# Patient Record
Sex: FEMALE | Race: BLACK OR AFRICAN AMERICAN | NOT HISPANIC OR LATINO | Employment: FULL TIME | URBAN - METROPOLITAN AREA
[De-identification: names, ages, dates, MRNs, and addresses within clinical notes are randomized per-mention and may not be internally consistent; named-entity substitution may affect disease eponyms.]

---
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
## 2021-08-10 ENCOUNTER — HISTORICAL (OUTPATIENT)
Dept: LAB | Facility: HOSPITAL | Age: 21
End: 2021-08-10

## 2021-08-10 LAB
ABS NEUT (OLG): 1.05 X10(3)/MCL (ref 2.1–9.2)
ALBUMIN SERPL-MCNC: 4 GM/DL (ref 3.5–5)
ALBUMIN/GLOB SERPL: 1.3 RATIO (ref 1.1–2)
ALP SERPL-CCNC: 49 UNIT/L (ref 40–150)
ALT SERPL-CCNC: 10 UNIT/L (ref 0–55)
AST SERPL-CCNC: 15 UNIT/L (ref 5–34)
BASOPHILS # BLD AUTO: 0 X10(3)/MCL (ref 0–0.2)
BASOPHILS NFR BLD AUTO: 1 %
BILIRUB SERPL-MCNC: 1 MG/DL
BILIRUBIN DIRECT+TOT PNL SERPL-MCNC: 0.4 MG/DL (ref 0–0.5)
BILIRUBIN DIRECT+TOT PNL SERPL-MCNC: 0.6 MG/DL (ref 0–0.8)
BUN SERPL-MCNC: 6.2 MG/DL (ref 7–18.7)
CALCIUM SERPL-MCNC: 9.2 MG/DL (ref 8.4–10.2)
CHLORIDE SERPL-SCNC: 105 MMOL/L (ref 98–107)
CHOLEST SERPL-MCNC: 155 MG/DL
CHOLEST/HDLC SERPL: 3 {RATIO} (ref 0–5)
CO2 SERPL-SCNC: 24 MMOL/L (ref 22–29)
CREAT SERPL-MCNC: 0.67 MG/DL (ref 0.55–1.02)
DEPRECATED CALCIDIOL+CALCIFEROL SERPL-MC: 20.3 NG/ML (ref 30–80)
EOSINOPHIL # BLD AUTO: 0.1 X10(3)/MCL (ref 0–0.9)
EOSINOPHIL NFR BLD AUTO: 3 %
ERYTHROCYTE [DISTWIDTH] IN BLOOD BY AUTOMATED COUNT: 15.4 % (ref 11.5–17)
GLOBULIN SER-MCNC: 3.1 GM/DL (ref 2.4–3.5)
GLUCOSE SERPL-MCNC: 81 MG/DL (ref 74–100)
HCT VFR BLD AUTO: 37 % (ref 37–47)
HDLC SERPL-MCNC: 59 MG/DL (ref 35–60)
HGB BLD-MCNC: 11.4 GM/DL (ref 12–16)
LDLC SERPL CALC-MCNC: 87 MG/DL (ref 50–140)
LYMPHOCYTES # BLD AUTO: 1.5 X10(3)/MCL (ref 0.6–4.6)
LYMPHOCYTES NFR BLD AUTO: 50 %
MCH RBC QN AUTO: 26 PG (ref 27–31)
MCHC RBC AUTO-ENTMCNC: 30.8 GM/DL (ref 33–36)
MCV RBC AUTO: 84.5 FL (ref 80–94)
MONOCYTES # BLD AUTO: 0.3 X10(3)/MCL (ref 0.1–1.3)
MONOCYTES NFR BLD AUTO: 9 %
NEUTROPHILS # BLD AUTO: 1.05 X10(3)/MCL (ref 1.4–7.9)
NEUTROPHILS NFR BLD AUTO: 36 %
PLATELET # BLD AUTO: 292 X10(3)/MCL (ref 130–400)
PMV BLD AUTO: 11 FL (ref 9.4–12.4)
POTASSIUM SERPL-SCNC: 4.3 MMOL/L (ref 3.5–5.1)
PROT SERPL-MCNC: 7.1 GM/DL (ref 6.4–8.3)
RBC # BLD AUTO: 4.38 X10(6)/MCL (ref 4.2–5.4)
SODIUM SERPL-SCNC: 137 MMOL/L (ref 136–145)
TRIGL SERPL-MCNC: 45 MG/DL (ref 37–140)
TSH SERPL-ACNC: 0.66 UIU/ML (ref 0.35–4.94)
VLDLC SERPL CALC-MCNC: 9 MG/DL
WBC # SPEC AUTO: 2.9 X10(3)/MCL (ref 4.5–11.5)

## 2022-04-11 ENCOUNTER — HISTORICAL (OUTPATIENT)
Dept: ADMINISTRATIVE | Facility: HOSPITAL | Age: 22
End: 2022-04-11
Payer: MEDICAID

## 2022-04-24 VITALS
DIASTOLIC BLOOD PRESSURE: 70 MMHG | SYSTOLIC BLOOD PRESSURE: 106 MMHG | OXYGEN SATURATION: 97 % | BODY MASS INDEX: 19.17 KG/M2 | WEIGHT: 115.06 LBS | HEIGHT: 65 IN

## 2022-06-14 ENCOUNTER — OFFICE VISIT (OUTPATIENT)
Dept: FAMILY MEDICINE | Facility: CLINIC | Age: 22
End: 2022-06-14
Payer: MEDICAID

## 2022-06-14 VITALS
DIASTOLIC BLOOD PRESSURE: 71 MMHG | HEIGHT: 64 IN | HEART RATE: 98 BPM | WEIGHT: 112 LBS | BODY MASS INDEX: 19.12 KG/M2 | SYSTOLIC BLOOD PRESSURE: 109 MMHG

## 2022-06-14 DIAGNOSIS — R10.13 EPIGASTRIC PAIN: ICD-10-CM

## 2022-06-14 DIAGNOSIS — G44.86 CERVICOGENIC HEADACHE: Primary | ICD-10-CM

## 2022-06-14 PROCEDURE — 1160F RVW MEDS BY RX/DR IN RCRD: CPT | Mod: CPTII,,, | Performed by: NURSE PRACTITIONER

## 2022-06-14 PROCEDURE — 99213 OFFICE O/P EST LOW 20 MIN: CPT | Mod: ,,, | Performed by: NURSE PRACTITIONER

## 2022-06-14 PROCEDURE — 1159F MED LIST DOCD IN RCRD: CPT | Mod: CPTII,,, | Performed by: NURSE PRACTITIONER

## 2022-06-14 PROCEDURE — 1159F PR MEDICATION LIST DOCUMENTED IN MEDICAL RECORD: ICD-10-PCS | Mod: CPTII,,, | Performed by: NURSE PRACTITIONER

## 2022-06-14 PROCEDURE — 3078F DIAST BP <80 MM HG: CPT | Mod: CPTII,,, | Performed by: NURSE PRACTITIONER

## 2022-06-14 PROCEDURE — 3008F BODY MASS INDEX DOCD: CPT | Mod: CPTII,,, | Performed by: NURSE PRACTITIONER

## 2022-06-14 PROCEDURE — 3078F PR MOST RECENT DIASTOLIC BLOOD PRESSURE < 80 MM HG: ICD-10-PCS | Mod: CPTII,,, | Performed by: NURSE PRACTITIONER

## 2022-06-14 PROCEDURE — 99213 PR OFFICE/OUTPT VISIT, EST, LEVL III, 20-29 MIN: ICD-10-PCS | Mod: ,,, | Performed by: NURSE PRACTITIONER

## 2022-06-14 PROCEDURE — 3008F PR BODY MASS INDEX (BMI) DOCUMENTED: ICD-10-PCS | Mod: CPTII,,, | Performed by: NURSE PRACTITIONER

## 2022-06-14 PROCEDURE — 3074F SYST BP LT 130 MM HG: CPT | Mod: CPTII,,, | Performed by: NURSE PRACTITIONER

## 2022-06-14 PROCEDURE — 1160F PR REVIEW ALL MEDS BY PRESCRIBER/CLIN PHARMACIST DOCUMENTED: ICD-10-PCS | Mod: CPTII,,, | Performed by: NURSE PRACTITIONER

## 2022-06-14 PROCEDURE — 3074F PR MOST RECENT SYSTOLIC BLOOD PRESSURE < 130 MM HG: ICD-10-PCS | Mod: CPTII,,, | Performed by: NURSE PRACTITIONER

## 2022-06-14 RX ORDER — KETOROLAC TROMETHAMINE 30 MG/ML
30 INJECTION, SOLUTION INTRAMUSCULAR; INTRAVENOUS
Status: COMPLETED | OUTPATIENT
Start: 2022-06-14 | End: 2022-06-14

## 2022-06-14 RX ORDER — ONDANSETRON 4 MG/1
4 TABLET, ORALLY DISINTEGRATING ORAL EVERY 8 HOURS PRN
Qty: 12 TABLET | Refills: 0 | Status: SHIPPED | OUTPATIENT
Start: 2022-06-14 | End: 2022-06-19

## 2022-06-14 RX ORDER — CYCLOBENZAPRINE HCL 10 MG
10 TABLET ORAL NIGHTLY
Qty: 15 TABLET | Refills: 0 | Status: SHIPPED | OUTPATIENT
Start: 2022-06-14 | End: 2022-06-29

## 2022-06-14 RX ORDER — OMEPRAZOLE 20 MG/1
20 CAPSULE, DELAYED RELEASE ORAL DAILY
Qty: 30 CAPSULE | Refills: 1 | Status: SHIPPED | OUTPATIENT
Start: 2022-06-14 | End: 2023-05-24

## 2022-06-14 RX ADMIN — KETOROLAC TROMETHAMINE 30 MG: 30 INJECTION, SOLUTION INTRAMUSCULAR; INTRAVENOUS at 01:06

## 2022-06-14 NOTE — PROGRESS NOTES
SUBJECTIVE:     History of Present Illness      Chief Complaint: Migraine (Migraine with upper abd pain x 2 weeks, fever last night (took 2nd COVID vacc. Yesterday )      HPI:  Patient is a 22 y.o. year old female who presents to clinic with complaints of headache and abdominal pain. Patient is not sure if the two symptoms are related.  Patient states over the past two weeks she has been having a headache on and off.  Patient states headache symptoms worsened yesterday after getting her 2nd COVID booster.  Patient states she has associated symptoms of fever of 101° F and myalgias.  Patient currently rates her headache as a 7/10 on the pain scale.  She states symptoms seemed to get worse throughout the day and are worse just before bed.  Symptoms start in the upper trap/neck region and radiate to the front of her head.  She does have history of neck pain and muscle spasm. Headache is not pulsatile. Denies thunderclap headache and worst headache of her life. Patient denies any changes in vision, auras, numbness, tingling, weakness of extremities, slurred speech, and facial drooping.  Patient states both her parents get migraines.  Patient does have family medical history of strokes in both of her grandparents at a old age.  Patient states she is maintaining hydration.  She has been taking ibuprofen before bed which has been relieving her symptoms mildly. Denies URI symptoms. She took a home COVID test about 5-7 days ago which was negative.     Patient states over the past couple of months she has been having on and off epigastric abdominal pain.  Patient states symptoms started suddenly. Currently patient denies abdominal pain. She describes the pain as sharp and rates as a 10/10 on the pain scale.  Patient does have medical history of stomach ulcer when she was in high school.  Things such as eating fatty foods make her pain worse.  Eating like food such as crackers make her pain better. Pain is worse with large  "meals. She has been taking Pepto-Bismol which does not seem to help.  She has associated symptoms of nausea which she has been taking Zofran for.  Patient denies any vomiting, coffee-ground emesis, and dark tarry stools. No diarrhea.     Review of Systems:  General: Denies fever, chills, fatigue, myalgias, and change in appetite   Eyes: Denies change in vision, eye redness, eye drainage, eye pain  ENT: Denies ear pain or pressure, rhinorrhea, nasal congestion, sore throat, and trouble swallowing  Resp: Denies wheezing, and shortness of breath   Cardio: Denies chest pain, palpitations, pleuritic pain, and edema   GI: See above   : Denies dysuria, hematuria, and discharge   MSK: Denies trauma, joint pain, and trouble ambulating   Neuro: Denies LOC, dizziness, seizure like activity, and focal deficits   Skin: Denies rashes, open lesions and ulcers      Previous History      Review of patient's allergies indicates:  No Known Allergies    History reviewed. No pertinent past medical history.  No current outpatient medications  Past Surgical History:   Procedure Laterality Date    APPENDECTOMY      hem       History reviewed. No pertinent family history.    Social History     Tobacco Use    Smoking status: Never Smoker    Smokeless tobacco: Never Used   Substance Use Topics    Alcohol use: Never    Drug use: Never       OBJECTIVE:     Physical Exam      Vital Signs Reviewed   /71   Pulse 98   Ht 5' 4" (1.626 m)   Wt 50.8 kg (112 lb)   BMI 19.22 kg/m²     Physical Exam:  General: Alert, well nourished, no acute distress, non-toxic appearing.   Eyes: Anicteric sclera, without conjunctival injection, normal lids, no purulent drainage, EOMs fully intact, PERRLA  Ears: No tragal tenderness. Tympanic membranes intact, pearly grey, without effusion or erythema and with a positive light reflex.   Mouth: Posterior pharynx without erythema. No exudate, ulcerations, or lesion. No tonsillar swelling.   Neck: Supple, " full ROM, no rigidity, no cervical adenopathy.   Cardio: Normal rate and rhythm without murmurs, gallops, or rubs.   Resp: Respirations even and unlabored, clear to auscultation bilaterally.   Abd: No ecchymosis or distension. Normal bowel sounds in all 4 quadrants.Tenderness to palpation in the epigastric and RUQ. Positive giordano. No rebound tenderness or guarding.   Skin: No rashes or open lesions noted.   MSK: No swelling. No abrasions or signs of trauma. Ambulating without assistance.  Full and equal strength of the upper and lower extremities bilaterally.  Full and equal  strength bilaterally.  Full and equal strength upon plantar and dorsiflexion of the feet bilaterally.  Neuro: CN1-12 intact fully. No focal deficits noted. Facial expressions even.      Assessment       1. Cervicogenic headache    2. Epigastric pain           Plan       1. Cervicogenic headache   Cyclobenzaprine and Tylenol. Torodol injection performed in clinic. Hold off on Ibuprofen as this may worsen GI symptoms. Discussed she should seek further medical attention at the first sign of any new or worsening symptoms.     May consider Mono testing and blood work at next visit if symptoms are not better.      2. Epigastric pain   Patient presents with episodic epigastric pain.  Currently denies any abdominal pain.  No coffee-ground emesis or dark tarry stools.  Does have history of peptic ulcer disease and has been taking more NSAIDs due to headache.    Abdominal ultrasound.  Follow-up with GI. Omeprazole 20 mg daily.  Discussed she should stay away from fatty and fried foods and spicy foods.  Zofran for nausea as needed.  Discussed with patient if she starts to experience severe abdominal pain she should go to ER immediately for further evaluation.  Follow-up in two weeks to discuss current symptoms and ultrasound results.               Future Appointments   Date Time Provider Department Center   6/28/2022  2:20 PM KATHI Peck  [facility]   8/4/2022  9:00 AM KATHI Peck [facility]

## 2022-06-15 ENCOUNTER — HOSPITAL ENCOUNTER (OUTPATIENT)
Dept: RADIOLOGY | Facility: HOSPITAL | Age: 22
Discharge: HOME | End: 2022-06-15
Attending: NURSE PRACTITIONER
Payer: MEDICAID

## 2022-06-15 DIAGNOSIS — R10.13 EPIGASTRIC PAIN: ICD-10-CM

## 2022-06-15 PROCEDURE — 76700 US EXAM ABDOM COMPLETE: CPT | Mod: TC

## 2022-06-30 ENCOUNTER — LAB VISIT (OUTPATIENT)
Dept: LAB | Facility: HOSPITAL | Age: 22
End: 2022-06-30
Attending: NURSE PRACTITIONER
Payer: MEDICAID

## 2022-06-30 DIAGNOSIS — Z03.89 ENCNTR FOR OBS FOR OTH SUSPECTED DISEASES AND COND RULED OUT: ICD-10-CM

## 2022-06-30 DIAGNOSIS — Z34.91 FIRST TRIMESTER PREGNANCY: ICD-10-CM

## 2022-06-30 LAB
AMPHET UR QL SCN: NEGATIVE
B-HCG FREE SERPL-ACNC: 147.16 MIU/ML
BARBITURATE SCN PRESENT UR: NEGATIVE
BASOPHILS # BLD AUTO: 0.03 X10(3)/MCL (ref 0–0.2)
BASOPHILS NFR BLD AUTO: 0.9 %
BENZODIAZ UR QL SCN: NEGATIVE
CANNABINOIDS UR QL SCN: POSITIVE
COCAINE UR QL SCN: NEGATIVE
EOSINOPHIL # BLD AUTO: 0.08 X10(3)/MCL (ref 0–0.9)
EOSINOPHIL NFR BLD AUTO: 2.4 %
ERYTHROCYTE [DISTWIDTH] IN BLOOD BY AUTOMATED COUNT: 15.3 % (ref 11.5–17)
HBV SURFACE AG SERPL QL IA: NONREACTIVE
HCT VFR BLD AUTO: 38.7 % (ref 37–47)
HCV AB SERPL QL IA: NONREACTIVE
HGB BLD-MCNC: 12.1 GM/DL (ref 12–16)
HIV 1+2 AB+HIV1 P24 AG SERPL QL IA: NONREACTIVE
HUMAN PAPILLOMAVIRUS (HPV): NORMAL
HUMAN PAPILLOMAVIRUS (HPV): NORMAL
IMM GRANULOCYTES # BLD AUTO: 0 X10(3)/MCL (ref 0–0.02)
IMM GRANULOCYTES NFR BLD AUTO: 0 % (ref 0–0.43)
LYMPHOCYTES # BLD AUTO: 1.4 X10(3)/MCL (ref 0.6–4.6)
LYMPHOCYTES NFR BLD AUTO: 41.8 %
MCH RBC QN AUTO: 26.8 PG (ref 27–31)
MCHC RBC AUTO-ENTMCNC: 31.3 MG/DL (ref 33–36)
MCV RBC AUTO: 85.8 FL (ref 80–94)
MONOCYTES # BLD AUTO: 0.28 X10(3)/MCL (ref 0.1–1.3)
MONOCYTES NFR BLD AUTO: 8.4 %
NEUTROPHILS # BLD AUTO: 1.6 X10(3)/MCL (ref 2.1–9.2)
NEUTROPHILS NFR BLD AUTO: 46.5 %
OPIATES UR QL SCN: NEGATIVE
PAP RECOMMENDATION EXT: NORMAL
PCP UR QL: NEGATIVE
PH UR: 7.5 [PH] (ref 3–11)
PLATELET # BLD AUTO: 272 X10(3)/MCL (ref 130–400)
PMV BLD AUTO: 10.3 FL (ref 7.4–10.4)
RBC # BLD AUTO: 4.51 X10(6)/MCL (ref 4.2–5.4)
SPECIFIC GRAVITY, URINE AUTO (.000) (OHS): 1.02 (ref 1–1.03)
T PALLIDUM AB SER QL: NONREACTIVE
T4 FREE SERPL-MCNC: 1.07 NG/DL (ref 0.7–1.48)
TSH SERPL-ACNC: 1.77 UIU/ML (ref 0.35–4.94)
WBC # SPEC AUTO: 3.4 X10(3)/MCL (ref 4.5–11.5)

## 2022-06-30 PROCEDURE — 86787 VARICELLA-ZOSTER ANTIBODY: CPT

## 2022-06-30 PROCEDURE — 86762 RUBELLA ANTIBODY: CPT

## 2022-06-30 PROCEDURE — 87591 N.GONORRHOEAE DNA AMP PROB: CPT | Performed by: NURSE PRACTITIONER

## 2022-06-30 PROCEDURE — 82664 ELECTROPHORETIC TEST: CPT

## 2022-06-30 PROCEDURE — 84443 ASSAY THYROID STIM HORMONE: CPT

## 2022-06-30 PROCEDURE — 36415 COLL VENOUS BLD VENIPUNCTURE: CPT

## 2022-06-30 PROCEDURE — 86901 BLOOD TYPING SEROLOGIC RH(D): CPT | Performed by: NURSE PRACTITIONER

## 2022-06-30 PROCEDURE — 87491 CHLMYD TRACH DNA AMP PROBE: CPT | Performed by: NURSE PRACTITIONER

## 2022-06-30 PROCEDURE — 85025 COMPLETE CBC W/AUTO DIFF WBC: CPT

## 2022-06-30 PROCEDURE — 83020 HEMOGLOBIN ELECTROPHORESIS: CPT

## 2022-06-30 PROCEDURE — 84144 ASSAY OF PROGESTERONE: CPT

## 2022-06-30 PROCEDURE — 86780 TREPONEMA PALLIDUM: CPT

## 2022-06-30 PROCEDURE — 86803 HEPATITIS C AB TEST: CPT

## 2022-06-30 PROCEDURE — 83068 HEMOGLOBIN UNSTABLE SCREEN: CPT

## 2022-06-30 PROCEDURE — 81025 URINE PREGNANCY TEST: CPT

## 2022-06-30 PROCEDURE — 87389 HIV-1 AG W/HIV-1&-2 AB AG IA: CPT

## 2022-06-30 PROCEDURE — 80307 DRUG TEST PRSMV CHEM ANLYZR: CPT

## 2022-06-30 PROCEDURE — 83789 MASS SPECTROMETRY QUAL/QUAN: CPT | Mod: 59

## 2022-06-30 PROCEDURE — 87340 HEPATITIS B SURFACE AG IA: CPT

## 2022-06-30 PROCEDURE — 84439 ASSAY OF FREE THYROXINE: CPT

## 2022-07-01 LAB
HGB A MFR BLD ELPH: 97.5 % (ref 95.8–98)
HGB A2 MFR BLD: 2.5 % (ref 2–3.3)
HGB F MFR BLD: 0 % (ref 0–0.9)
HGB FRACT BLD ELPH-IMP: NORMAL
M HPLC HB VARIANT, B: NORMAL
PROGEST SERPL IA-MCNC: 11 NG/ML
RUBV IGG SERPL IA-ACNC: 2.1
RUBV IGG SERPL QL IA: POSITIVE
VZV IGG SER IA-ACNC: 1.7
VZV IGG SER QL IA: POSITIVE

## 2022-07-06 LAB
ABORH RETYPE: NORMAL
C TRACH DNA SPEC QL NAA+PROBE: NOT DETECTED
GROUP & RH: NORMAL
INDIRECT COOMBS GEL: NORMAL
N GONORRHOEA DNA SPEC QL NAA+PROBE: NOT DETECTED

## 2022-10-04 ENCOUNTER — HOSPITAL ENCOUNTER (EMERGENCY)
Facility: HOSPITAL | Age: 22
Discharge: HOME | End: 2022-10-04
Attending: EMERGENCY MEDICINE
Payer: MEDICAID

## 2022-10-04 VITALS
DIASTOLIC BLOOD PRESSURE: 68 MMHG | HEIGHT: 64 IN | OXYGEN SATURATION: 99 % | RESPIRATION RATE: 16 BRPM | WEIGHT: 118 LBS | HEART RATE: 66 BPM | TEMPERATURE: 98 F | BODY MASS INDEX: 20.14 KG/M2 | SYSTOLIC BLOOD PRESSURE: 118 MMHG

## 2022-10-04 DIAGNOSIS — N30.00 ACUTE CYSTITIS WITHOUT HEMATURIA: ICD-10-CM

## 2022-10-04 DIAGNOSIS — M54.16 LUMBAR RADICULOPATHY: Primary | ICD-10-CM

## 2022-10-04 LAB
APPEARANCE UR: ABNORMAL
BACTERIA #/AREA URNS AUTO: ABNORMAL /HPF
BILIRUB UR QL STRIP.AUTO: NEGATIVE MG/DL
COLOR UR AUTO: YELLOW
GLUCOSE UR QL STRIP.AUTO: NEGATIVE MG/DL
KETONES UR QL STRIP.AUTO: NEGATIVE MG/DL
LEUKOCYTE ESTERASE UR QL STRIP.AUTO: ABNORMAL UNIT/L
NITRITE UR QL STRIP.AUTO: NEGATIVE
PH UR STRIP.AUTO: 7 [PH]
PROT UR QL STRIP.AUTO: NEGATIVE MG/DL
RBC #/AREA URNS AUTO: <5 /HPF
RBC UR QL AUTO: NEGATIVE UNIT/L
SP GR UR STRIP.AUTO: 1.02 (ref 1–1.03)
SQUAMOUS #/AREA URNS AUTO: 24 /HPF
UROBILINOGEN UR STRIP-ACNC: 1 MG/DL
WBC #/AREA URNS AUTO: 6 /HPF

## 2022-10-04 PROCEDURE — 81001 URINALYSIS AUTO W/SCOPE: CPT | Performed by: NURSE PRACTITIONER

## 2022-10-04 PROCEDURE — 25000003 PHARM REV CODE 250: Performed by: EMERGENCY MEDICINE

## 2022-10-04 PROCEDURE — 99284 EMERGENCY DEPT VISIT MOD MDM: CPT

## 2022-10-04 RX ORDER — HYDROCODONE BITARTRATE AND ACETAMINOPHEN 5; 325 MG/1; MG/1
1 TABLET ORAL
Status: COMPLETED | OUTPATIENT
Start: 2022-10-04 | End: 2022-10-04

## 2022-10-04 RX ORDER — HYDROCODONE BITARTRATE AND ACETAMINOPHEN 5; 325 MG/1; MG/1
1 TABLET ORAL EVERY 8 HOURS PRN
Qty: 8 TABLET | Refills: 0 | Status: SHIPPED | OUTPATIENT
Start: 2022-10-04 | End: 2022-10-07

## 2022-10-04 RX ORDER — CEPHALEXIN 500 MG/1
500 CAPSULE ORAL EVERY 8 HOURS
Qty: 15 CAPSULE | Refills: 0 | Status: SHIPPED | OUTPATIENT
Start: 2022-10-04 | End: 2022-10-09

## 2022-10-04 RX ADMIN — HYDROCODONE BITARTRATE AND ACETAMINOPHEN 1 TABLET: 5; 325 TABLET ORAL at 01:10

## 2022-10-04 NOTE — ED PROVIDER NOTES
Encounter Date: 10/4/2022    SCRIBE #1 NOTE: I, Diya Linn, am scribing for, and in the presence of,  Ramona Ruiz MD. I have scribed the following portions of the note - Other sections scribed: HPI, ROS, PE.     History     Chief Complaint   Patient presents with    Back Pain    Tailbone Pain    Dysuria     Back/tailbone pain since Friday. Denies recent trauma/injury. PMH herniated disc, Pregnant 17 weeks, deneis vaginal bleeding     22 year old G1 approximately 17 weeks pregnant, with a known history of a herniated disk presents to the ED for lower back / tailbone pain that shoots down her right leg beginning 4 days ago. The patient reports that it worsens with sitting and certain movements. She has been taking 2 tylenol extra strength every 4-6 hours, icing, and using a heating pad with only minimal relief. She denies falls or trauma. She denies dysuria, hematuria, fever, urinary frequency, vaginal bleeding, or vaginal discharge. Her OB/Gyn is Dr. Jayme Boudreaux and she has not contacted him about her pain.    The history is provided by the patient. No  was used.   Back Pain   This is a new problem. Illness onset: 4 days ago. The problem occurs constantly. The problem has been unchanged. The pain is associated with no known injury. The pain is present in the sacro-iliac joint. The quality of the pain is described as shooting. The pain radiates to the right leg. Exacerbated by: sitting. Pertinent negatives include no chest pain, no fever, no numbness, no headaches, no abdominal pain, no dysuria and no weakness. Treatments tried: extra strength tylenol, ice, heat. Risk factors include pregnancy.   Dysuria   Pertinent negatives include no nausea, no vomiting, no frequency and no hematuria.   Review of patient's allergies indicates:  No Known Allergies  No past medical history on file.  Past Surgical History:   Procedure Laterality Date    APPENDECTOMY      hem       No family history on  file.  Social History     Tobacco Use    Smoking status: Never    Smokeless tobacco: Never   Substance Use Topics    Alcohol use: Never    Drug use: Never     Review of Systems   Constitutional:  Negative for fever.   HENT:  Negative for rhinorrhea.    Respiratory:  Negative for cough and shortness of breath.    Cardiovascular:  Negative for chest pain.   Gastrointestinal:  Negative for abdominal pain, constipation, diarrhea, nausea and vomiting.   Genitourinary:  Negative for dysuria, frequency, hematuria, vaginal bleeding and vaginal discharge.   Musculoskeletal:  Positive for back pain. Negative for neck pain.   Skin:  Negative for rash.   Neurological:  Negative for weakness, numbness and headaches.     Physical Exam     Initial Vitals [10/04/22 0912]   BP Pulse Resp Temp SpO2   117/79 81 17 97.7 °F (36.5 °C) 98 %      MAP       --         Physical Exam    Nursing note and vitals reviewed.  Constitutional: She appears well-developed and well-nourished. She is not diaphoretic. No distress.   HENT:   Head: Normocephalic and atraumatic.   Mouth/Throat: Oropharynx is clear and moist.   Eyes: Conjunctivae and EOM are normal.   Neck: Neck supple.   Normal range of motion.  Cardiovascular:  Normal rate, regular rhythm and intact distal pulses.           Pulses:       Dorsalis pedis pulses are 2+ on the right side and 2+ on the left side.   Pulmonary/Chest: Breath sounds normal. No respiratory distress. She has no wheezes. She has no rhonchi. She has no rales.   Abdominal: Abdomen is soft. Bowel sounds are normal. She exhibits no distension. There is no abdominal tenderness.   Gravid abdomen, Fetal heart rate is 158 bpm on bedside ultrasound with good fetal movement  No CVA TTP   No right CVA tenderness.  No left CVA tenderness.   Genitourinary:    Genitourinary Comments: deferred     Musculoskeletal:         General: No tenderness or edema. Normal range of motion.      Cervical back: Normal range of motion and neck  supple.      Lumbar back: Abnormal right straight leg raise test: ipsilateral.      Comments: No step offs, deformities, or erythema noted around the L spine, no midline TTP to the L spine; she does have right lateral TTP reproducing her symptoms of pain     Neurological: She is alert and oriented to person, place, and time. She has normal strength. No sensory deficit. GCS score is 15. GCS eye subscore is 4. GCS verbal subscore is 5. GCS motor subscore is 6.   + ipsilateral SLR   Skin: Skin is warm and dry. Capillary refill takes less than 2 seconds. No rash noted.   Psychiatric: She has a normal mood and affect.       ED Course   Procedures  Labs Reviewed   URINALYSIS, REFLEX TO URINE CULTURE - Abnormal; Notable for the following components:       Result Value    Appearance, UA Cloudy (*)     Leukocyte Esterase, UA 1+ (*)     All other components within normal limits   URINALYSIS, MICROSCOPIC - Abnormal; Notable for the following components:    WBC, UA 6 (*)     Squamous Epithelial Cells, UA 24 (*)     Bacteria, UA 1+ (*)     All other components within normal limits          Imaging Results    None          Medications   HYDROcodone-acetaminophen 5-325 mg per tablet 1 tablet (1 tablet Oral Given 10/4/22 1302)     Medical Decision Making:   Initial Assessment:   22-year-old G1, approximately 17 weeks pregnant, presents for evaluation of atraumatic lower back pain, reproducible on exam.  No midline tenderness.  I do not feel emergent imaging is warranted.  UA sent at triage is significant for bacteria for which I will treat with keflex.  I have discussed pain medication options, she is comfortable trying Norco for a brief period of time.  She is to discuss continued pain management with her OBGYN and contact him for re-evaluation next week.  Return precautions discussed in detail she is discharged  Differential Diagnosis:   Musculoskeletal pain, UTI, herniated disk and others  Clinical Tests:   Lab Tests: Ordered  and Reviewed  Radiological Study: Reviewed and Ordered        Scribe Attestation:   Scribe #1: I performed the above scribed service and the documentation accurately describes the services I performed. I attest to the accuracy of the note.    Attending Attestation:           Physician Attestation for Scribe:  Physician Attestation Statement for Scribe #1: I, reviewed documentation, as scribed by Diya Linn in my presence, and it is both accurate and complete.                        Clinical Impression:   Final diagnoses:  [M54.16] Lumbar radiculopathy (Primary)  [N30.00] Acute cystitis without hematuria      ED Disposition Condition    Discharge Stable          ED Prescriptions       Medication Sig Dispense Start Date End Date Auth. Provider    HYDROcodone-acetaminophen (NORCO) 5-325 mg per tablet Take 1 tablet by mouth every 8 (eight) hours as needed for Pain. 8 tablet 10/4/2022 10/7/2022 Ramona Ruiz MD    cephALEXin (KEFLEX) 500 MG capsule Take 1 capsule (500 mg total) by mouth every 8 (eight) hours. for 5 days 15 capsule 10/4/2022 10/9/2022 Ramona Ruiz MD          Follow-up Information       Follow up With Specialties Details Why Contact Info    Jayme Boudreaux Jr., MD Obstetrics and Gynecology Call in 2 days  [address] [facility] [address]  [phone]      [facility]  Emergency Dept Emergency Medicine  As needed, If symptoms worsen [address] [facility] [id number] [phone]    Aundrea Ramirez, P Family Medicine In 2 days  [address] [facility] [address]  [phone]               Ramona Ruiz MD  10/06/22 0514

## 2022-10-14 ENCOUNTER — LAB VISIT (OUTPATIENT)
Dept: LAB | Facility: HOSPITAL | Age: 22
End: 2022-10-14
Attending: OBSTETRICS & GYNECOLOGY
Payer: MEDICAID

## 2022-10-14 DIAGNOSIS — Z34.02 ENCOUNTER FOR SUPERVISION OF NORMAL FIRST PREGNANCY IN SECOND TRIMESTER: ICD-10-CM

## 2022-10-14 PROCEDURE — 81511 FTL CGEN ABNOR FOUR ANAL: CPT

## 2022-10-14 PROCEDURE — 36415 COLL VENOUS BLD VENIPUNCTURE: CPT

## 2022-10-17 LAB
# FETUSES: NORMAL
2ND TRIMESTER 4 SCREEN SERPL-IMP: NORMAL
AFP ADJ MOM SERPL: 1.71 MOM
AFP SERPL IA-MCNC: 124.8 NG/ML
AGE AT DELIVERY: NORMAL
B-HCG ADJ MOM SERPL: 1.65 MOM
CHORION TYPE: NORMAL
COLLECT DATE: NORMAL
CURRENT SMOKER: NORMAL
FET TS 21 RISK FROM MAT AGE: NORMAL
GA EST FROM LMP: NORMAL WK,D
GA METHOD: NORMAL
HCG SERPL IA-ACNC: 41 IU/ML
HX OF NTD QL: NO
HX OF NTD QL: NO
HX OF TRISOMY 21 QL: NO
IDDM PATIENT QL: NO
INHIBIN A ADJ MOM SERPL: 1.94 MOM
INHIBIN SERPL-MCNC: 330 PG/ML
IVF PREGNANCY: NO
LABORATORY COMMENT REPORT: NORMAL
M PHYSICIAN PHONE NUMBER: NORMAL
MATERNAL RISK FACTORS: NORMAL
NEURAL TUBE DEFECT RISK FETUS: NORMAL %
RECOM F/U: NORMAL
TEST PERFORMANCE INFO SPEC: NORMAL
TS 18 RISK FETUS: NORMAL
TS 21 RISK FETUS: NORMAL
U ESTRIOL ADJ MOM SERPL: 0.42 MOM
U ESTRIOL SERPL-MCNC: 0.94 NG/ML

## 2022-11-08 ENCOUNTER — DOCUMENTATION ONLY (OUTPATIENT)
Dept: FAMILY MEDICINE | Facility: CLINIC | Age: 22
End: 2022-11-08
Payer: MEDICAID

## 2022-11-21 ENCOUNTER — LAB VISIT (OUTPATIENT)
Dept: LAB | Facility: HOSPITAL | Age: 22
End: 2022-11-21
Attending: OBSTETRICS & GYNECOLOGY
Payer: MEDICAID

## 2022-11-21 DIAGNOSIS — O10.012 PRE-EXISTING ESSENTIAL HYPERTENSION COMPLICATING PREGNANCY IN SECOND TRIMESTER: ICD-10-CM

## 2022-11-21 DIAGNOSIS — Z3A.24 24 WEEKS GESTATION OF PREGNANCY: Primary | ICD-10-CM

## 2022-11-21 LAB
CREAT CL 24H UR+SERPL-VRATE: 145.2 ML/MIN (ref 70–157)
CREAT SERPL-MCNC: 0.53 MG/DL (ref 0.55–1.02)
CREAT SERPL-MCNC: 0.53 MG/DL (ref 0.55–1.02)
CREAT UR-MCNC: 59.9 MG/DL (ref 47–110)
GFR SERPLBLD CREATININE-BSD FMLA CKD-EPI: >60 MLS/MIN/1.73/M2
HRS COLLECTED CREATININE (OHS): 24 HR
PROT 24H UR-MCNC: NORMAL G/DL
PROT UR STRIP-MCNC: <6.8 MG/DL
TOTAL VOLUME  (OHS): 1850 ML
TOTAL VOLUME  (OHS): 1850 ML

## 2022-11-21 PROCEDURE — 82575 CREATININE CLEARANCE TEST: CPT

## 2022-11-21 PROCEDURE — 84156 ASSAY OF PROTEIN URINE: CPT

## 2022-11-21 PROCEDURE — 36415 COLL VENOUS BLD VENIPUNCTURE: CPT

## 2022-12-13 ENCOUNTER — LAB VISIT (OUTPATIENT)
Dept: LAB | Facility: HOSPITAL | Age: 22
End: 2022-12-13
Attending: OBSTETRICS & GYNECOLOGY
Payer: MEDICAID

## 2022-12-13 DIAGNOSIS — Z34.82 ENCOUNTER FOR SUPERVISION OF NORMAL PREGNANCY IN MULTIGRAVIDA IN SECOND TRIMESTER: ICD-10-CM

## 2022-12-13 LAB — GLUCOSE 1H P 100 G GLC PO SERPL-MCNC: 109 MG/DL (ref 74–100)

## 2022-12-13 PROCEDURE — 36415 COLL VENOUS BLD VENIPUNCTURE: CPT

## 2022-12-13 PROCEDURE — 82950 GLUCOSE TEST: CPT

## 2023-01-16 ENCOUNTER — HOSPITAL ENCOUNTER (EMERGENCY)
Facility: HOSPITAL | Age: 23
Discharge: HOME | End: 2023-01-16
Attending: FAMILY MEDICINE
Payer: MEDICAID

## 2023-01-16 VITALS
HEART RATE: 78 BPM | SYSTOLIC BLOOD PRESSURE: 127 MMHG | RESPIRATION RATE: 20 BRPM | DIASTOLIC BLOOD PRESSURE: 63 MMHG | OXYGEN SATURATION: 100 %

## 2023-01-16 DIAGNOSIS — Z34.90 PREGNANCY, UNSPECIFIED GESTATIONAL AGE: Primary | ICD-10-CM

## 2023-01-16 LAB
POCT GLUCOSE: 69 MG/DL (ref 70–110)
POCT GLUCOSE: 72 MG/DL (ref 70–110)

## 2023-01-16 PROCEDURE — 99284 EMERGENCY DEPT VISIT MOD MDM: CPT

## 2023-01-16 PROCEDURE — 82962 GLUCOSE BLOOD TEST: CPT

## 2023-01-16 NOTE — ED PROVIDER NOTES
Encounter Date: 1/16/2023       History     Chief Complaint   Patient presents with    Abdominal Pain    Dizziness     Amb to ED states glucose too high to read dizziness nausea upper right quadrant pain started at 1100 pm intermittent sharp skin warm dry color normal      22-year-old who is 33 weeks pregnant had tested herself at home and was told by the machine that she was too high to read x5 patient became concerned and came here here patient's blood sugar is within normal limits and no issues initially patient had described feelings that she was having with the sugar but patient states she actually has no pain no concerns no symptoms no vaginal bleeding and she does feel the fetus move.      Review of patient's allergies indicates:  No Known Allergies  No past medical history on file.  Past Surgical History:   Procedure Laterality Date    APPENDECTOMY      hem       No family history on file.  Social History     Tobacco Use    Smoking status: Never    Smokeless tobacco: Never   Substance Use Topics    Alcohol use: Never    Drug use: Never     Review of Systems   Constitutional:  Negative for fever.   HENT:  Negative for sore throat.    Respiratory:  Negative for shortness of breath.    Cardiovascular:  Negative for chest pain.   Gastrointestinal:  Negative for nausea.   Genitourinary:  Negative for dysuria.   Musculoskeletal:  Negative for back pain.   Skin:  Negative for rash.   Neurological:  Negative for weakness.   Hematological:  Does not bruise/bleed easily.     Physical Exam     Initial Vitals [01/16/23 0039]   BP Pulse Resp Temp SpO2   127/63 78 20 -- 100 %      MAP       --         Physical Exam    Nursing note and vitals reviewed.  Constitutional: She appears well-developed.   HENT:   Head: Normocephalic and atraumatic.   Right Ear: External ear normal.   Left Ear: External ear normal.   Nose: Nose normal.   Mouth/Throat: Oropharynx is clear and moist. No oropharyngeal exudate.   Eyes: Conjunctivae  and EOM are normal. Pupils are equal, round, and reactive to light. Right eye exhibits no discharge. Left eye exhibits no discharge.   Neck: Neck supple. No tracheal deviation present. No JVD present.   Normal range of motion.  Cardiovascular:  Normal rate, regular rhythm, normal heart sounds and intact distal pulses.     Exam reveals no gallop and no friction rub.       No murmur heard.  Pulmonary/Chest: Breath sounds normal. No stridor. No respiratory distress. She has no wheezes. She has no rhonchi. She has no rales.   Abdominal: Abdomen is soft. Bowel sounds are normal. She exhibits no distension and no mass. There is no abdominal tenderness. There is no rebound and no guarding.   Musculoskeletal:         General: Normal range of motion.      Cervical back: Normal range of motion and neck supple.     Neurological: She is alert and oriented to person, place, and time. She has normal strength. No cranial nerve deficit.   Skin: Skin is warm and dry. No rash and no abscess noted. No erythema.   Psychiatric: She has a normal mood and affect. Her behavior is normal. Judgment and thought content normal.       ED Course   Procedures  Labs Reviewed - No data to display       Imaging Results    None          Medications - No data to display                           Clinical Impression:   Final diagnoses:  [Z34.90] Pregnancy, unspecified gestational age (Primary)        ED Disposition Condition    Discharge Stable          ED Prescriptions    None       Follow-up Information    None          James Kaplan MD  01/16/23 0048

## 2023-01-18 ENCOUNTER — LAB VISIT (OUTPATIENT)
Dept: LAB | Facility: HOSPITAL | Age: 23
End: 2023-01-18
Attending: OBSTETRICS & GYNECOLOGY
Payer: MEDICAID

## 2023-01-18 DIAGNOSIS — Z34.03 ENCOUNTER FOR SUPERVISION OF NORMAL FIRST PREGNANCY IN THIRD TRIMESTER: ICD-10-CM

## 2023-01-18 LAB
ALBUMIN SERPL-MCNC: 3.1 G/DL (ref 3.5–5)
ALBUMIN/GLOB SERPL: 0.8 RATIO (ref 1.1–2)
ALP SERPL-CCNC: 87 UNIT/L (ref 40–150)
ALT SERPL-CCNC: 8 UNIT/L (ref 0–55)
AST SERPL-CCNC: 14 UNIT/L (ref 5–34)
BASOPHILS # BLD AUTO: 0.02 X10(3)/MCL (ref 0–0.2)
BASOPHILS NFR BLD AUTO: 0.4 %
BILIRUBIN DIRECT+TOT PNL SERPL-MCNC: 0.7 MG/DL
BUN SERPL-MCNC: 6.4 MG/DL (ref 7–18.7)
CALCIUM SERPL-MCNC: 9 MG/DL (ref 8.4–10.2)
CHLORIDE SERPL-SCNC: 105 MMOL/L (ref 98–107)
CO2 SERPL-SCNC: 25 MMOL/L (ref 22–29)
CREAT SERPL-MCNC: 0.52 MG/DL (ref 0.55–1.02)
EOSINOPHIL # BLD AUTO: 0.08 X10(3)/MCL (ref 0–0.9)
EOSINOPHIL NFR BLD AUTO: 1.4 %
ERYTHROCYTE [DISTWIDTH] IN BLOOD BY AUTOMATED COUNT: 14.6 % (ref 11.5–17)
GFR SERPLBLD CREATININE-BSD FMLA CKD-EPI: >60 MLS/MIN/1.73/M2
GLOBULIN SER-MCNC: 3.7 GM/DL (ref 2.4–3.5)
GLUCOSE SERPL-MCNC: 76 MG/DL (ref 74–100)
HCT VFR BLD AUTO: 31.4 % (ref 37–47)
HGB BLD-MCNC: 9.2 GM/DL (ref 12–16)
IMM GRANULOCYTES # BLD AUTO: 0.02 X10(3)/MCL (ref 0–0.04)
IMM GRANULOCYTES NFR BLD AUTO: 0.4 %
LDH SERPL-CCNC: 178 U/L (ref 125–220)
LYMPHOCYTES # BLD AUTO: 1.19 X10(3)/MCL (ref 0.6–4.6)
LYMPHOCYTES NFR BLD AUTO: 21.1 %
MCH RBC QN AUTO: 25 PG
MCHC RBC AUTO-ENTMCNC: 29.3 MG/DL (ref 33–36)
MCV RBC AUTO: 85.3 FL (ref 80–94)
MONOCYTES # BLD AUTO: 0.37 X10(3)/MCL (ref 0.1–1.3)
MONOCYTES NFR BLD AUTO: 6.6 %
NEUTROPHILS # BLD AUTO: 3.96 X10(3)/MCL (ref 2.1–9.2)
NEUTROPHILS NFR BLD AUTO: 70.1 %
PLATELET # BLD AUTO: 194 X10(3)/MCL (ref 130–400)
PMV BLD AUTO: 10.9 FL (ref 7.4–10.4)
POTASSIUM SERPL-SCNC: 4.1 MMOL/L (ref 3.5–5.1)
PROT SERPL-MCNC: 6.8 GM/DL (ref 6.4–8.3)
RBC # BLD AUTO: 3.68 X10(6)/MCL (ref 4.2–5.4)
SODIUM SERPL-SCNC: 138 MMOL/L (ref 136–145)
URATE SERPL-MCNC: 2.6 MG/DL (ref 2.6–6)
WBC # SPEC AUTO: 5.6 X10(3)/MCL (ref 4.5–11.5)

## 2023-01-18 PROCEDURE — 80053 COMPREHEN METABOLIC PANEL: CPT

## 2023-01-18 PROCEDURE — 36415 COLL VENOUS BLD VENIPUNCTURE: CPT

## 2023-01-18 PROCEDURE — 84550 ASSAY OF BLOOD/URIC ACID: CPT

## 2023-01-18 PROCEDURE — 83615 LACTATE (LD) (LDH) ENZYME: CPT

## 2023-01-18 PROCEDURE — 85025 COMPLETE CBC W/AUTO DIFF WBC: CPT

## 2023-01-30 ENCOUNTER — LAB VISIT (OUTPATIENT)
Dept: LAB | Facility: HOSPITAL | Age: 23
End: 2023-01-30
Attending: OBSTETRICS & GYNECOLOGY
Payer: MEDICAID

## 2023-01-30 DIAGNOSIS — Z34.03 ENCOUNTER FOR SUPERVISION OF NORMAL FIRST PREGNANCY IN THIRD TRIMESTER: ICD-10-CM

## 2023-01-30 LAB
BASOPHILS # BLD AUTO: 0.02 X10(3)/MCL (ref 0–0.2)
BASOPHILS NFR BLD AUTO: 0.4 %
EOSINOPHIL # BLD AUTO: 0.06 X10(3)/MCL (ref 0–0.9)
EOSINOPHIL NFR BLD AUTO: 1.2 %
ERYTHROCYTE [DISTWIDTH] IN BLOOD BY AUTOMATED COUNT: 14.8 % (ref 11.5–17)
HCT VFR BLD AUTO: 30.5 % (ref 37–47)
HGB BLD-MCNC: 9 GM/DL (ref 12–16)
HIV 1+2 AB+HIV1 P24 AG SERPL QL IA: NONREACTIVE
IMM GRANULOCYTES # BLD AUTO: 0.02 X10(3)/MCL (ref 0–0.04)
IMM GRANULOCYTES NFR BLD AUTO: 0.4 %
LYMPHOCYTES # BLD AUTO: 0.97 X10(3)/MCL (ref 0.6–4.6)
LYMPHOCYTES NFR BLD AUTO: 18.6 %
MCH RBC QN AUTO: 24.6 PG
MCHC RBC AUTO-ENTMCNC: 29.5 MG/DL (ref 33–36)
MCV RBC AUTO: 83.3 FL (ref 80–94)
MONOCYTES # BLD AUTO: 0.39 X10(3)/MCL (ref 0.1–1.3)
MONOCYTES NFR BLD AUTO: 7.5 %
NEUTROPHILS # BLD AUTO: 3.75 X10(3)/MCL (ref 2.1–9.2)
NEUTROPHILS NFR BLD AUTO: 71.9 %
PLATELET # BLD AUTO: 188 X10(3)/MCL (ref 130–400)
PMV BLD AUTO: 11.1 FL (ref 7.4–10.4)
RBC # BLD AUTO: 3.66 X10(6)/MCL (ref 4.2–5.4)
T PALLIDUM AB SER QL: NONREACTIVE
WBC # SPEC AUTO: 5.2 X10(3)/MCL (ref 4.5–11.5)

## 2023-01-30 PROCEDURE — 36415 COLL VENOUS BLD VENIPUNCTURE: CPT

## 2023-01-30 PROCEDURE — 87389 HIV-1 AG W/HIV-1&-2 AB AG IA: CPT

## 2023-01-30 PROCEDURE — 86780 TREPONEMA PALLIDUM: CPT

## 2023-01-30 PROCEDURE — 85025 COMPLETE CBC W/AUTO DIFF WBC: CPT

## 2023-02-07 ENCOUNTER — HOSPITAL ENCOUNTER (OUTPATIENT)
Facility: HOSPITAL | Age: 23
Discharge: HOME | End: 2023-02-07
Attending: OBSTETRICS & GYNECOLOGY | Admitting: OBSTETRICS & GYNECOLOGY
Payer: MEDICAID

## 2023-02-07 VITALS — SYSTOLIC BLOOD PRESSURE: 123 MMHG | DIASTOLIC BLOOD PRESSURE: 62 MMHG | HEART RATE: 88 BPM

## 2023-02-07 DIAGNOSIS — Z34.90 PREGNANT: ICD-10-CM

## 2023-02-07 PROCEDURE — 59025 FETAL NON-STRESS TEST: CPT

## 2023-02-07 PROCEDURE — 63600175 PHARM REV CODE 636 W HCPCS: Performed by: OBSTETRICS & GYNECOLOGY

## 2023-02-07 PROCEDURE — 96372 THER/PROPH/DIAG INJ SC/IM: CPT

## 2023-02-07 RX ORDER — BETAMETHASONE SODIUM PHOSPHATE AND BETAMETHASONE ACETATE 3; 3 MG/ML; MG/ML
INJECTION, SUSPENSION INTRA-ARTICULAR; INTRALESIONAL; INTRAMUSCULAR; SOFT TISSUE
Status: DISCONTINUED
Start: 2023-02-07 | End: 2023-02-07 | Stop reason: HOSPADM

## 2023-02-07 RX ORDER — BETAMETHASONE SODIUM PHOSPHATE AND BETAMETHASONE ACETATE 3; 3 MG/ML; MG/ML
12 INJECTION, SUSPENSION INTRA-ARTICULAR; INTRALESIONAL; INTRAMUSCULAR; SOFT TISSUE
Status: COMPLETED | OUTPATIENT
Start: 2023-02-07 | End: 2023-02-07

## 2023-02-07 RX ADMIN — BETAMETHASONE SODIUM PHOSPHATE AND BETAMETHASONE ACETATE 12 MG: 3; 3 INJECTION, SUSPENSION INTRA-ARTICULAR; INTRALESIONAL; INTRAMUSCULAR at 02:02

## 2023-02-08 ENCOUNTER — HOSPITAL ENCOUNTER (OUTPATIENT)
Facility: HOSPITAL | Age: 23
Discharge: HOME | End: 2023-02-08
Attending: OBSTETRICS & GYNECOLOGY | Admitting: OBSTETRICS & GYNECOLOGY
Payer: MEDICAID

## 2023-02-08 VITALS — DIASTOLIC BLOOD PRESSURE: 59 MMHG | SYSTOLIC BLOOD PRESSURE: 119 MMHG | OXYGEN SATURATION: 100 % | HEART RATE: 96 BPM

## 2023-02-08 DIAGNOSIS — O36.5990 IUGR (INTRAUTERINE GROWTH RESTRICTION) AFFECTING CARE OF MOTHER: ICD-10-CM

## 2023-02-08 PROCEDURE — 63600175 PHARM REV CODE 636 W HCPCS: Performed by: OBSTETRICS & GYNECOLOGY

## 2023-02-08 PROCEDURE — 59025 FETAL NON-STRESS TEST: CPT

## 2023-02-08 RX ORDER — BETAMETHASONE SODIUM PHOSPHATE AND BETAMETHASONE ACETATE 3; 3 MG/ML; MG/ML
12 INJECTION, SUSPENSION INTRA-ARTICULAR; INTRALESIONAL; INTRAMUSCULAR; SOFT TISSUE ONCE
Status: COMPLETED | OUTPATIENT
Start: 2023-02-08 | End: 2023-02-08

## 2023-02-08 RX ADMIN — BETAMETHASONE SODIUM PHOSPHATE AND BETAMETHASONE ACETATE 12 MG: 3; 3 INJECTION, SUSPENSION INTRA-ARTICULAR; INTRALESIONAL; INTRAMUSCULAR at 02:02

## 2023-02-08 NOTE — DISCHARGE INSTRUCTIONS
Rn at  giving d/c instructions to pt. Notified to report back to hospital for suspected ROM, vaginal bleeding, regular contractions, or decreased fetal movement. Also notified to keep follow up appointments with physician. Pt verbalized understanding and did not have any questions at this time.

## 2023-02-10 ENCOUNTER — HOSPITAL ENCOUNTER (EMERGENCY)
Facility: HOSPITAL | Age: 23
Discharge: HOME | End: 2023-02-10
Attending: OBSTETRICS & GYNECOLOGY
Payer: MEDICAID

## 2023-02-10 VITALS
TEMPERATURE: 99 F | RESPIRATION RATE: 18 BRPM | SYSTOLIC BLOOD PRESSURE: 129 MMHG | HEART RATE: 83 BPM | DIASTOLIC BLOOD PRESSURE: 66 MMHG

## 2023-02-10 PROBLEM — O26.899 ABDOMINAL PAIN AFFECTING PREGNANCY: Status: ACTIVE | Noted: 2023-02-10

## 2023-02-10 PROBLEM — R10.9 ABDOMINAL PAIN AFFECTING PREGNANCY: Status: ACTIVE | Noted: 2023-02-10

## 2023-02-10 PROCEDURE — 99284 EMERGENCY DEPT VISIT MOD MDM: CPT | Mod: 25

## 2023-02-10 PROCEDURE — 63600175 PHARM REV CODE 636 W HCPCS: Performed by: OBSTETRICS & GYNECOLOGY

## 2023-02-10 RX ORDER — ACETAMINOPHEN 325 MG/1
650 TABLET ORAL EVERY 6 HOURS PRN
Status: DISCONTINUED | OUTPATIENT
Start: 2023-02-10 | End: 2023-02-10 | Stop reason: HOSPADM

## 2023-02-10 RX ADMIN — SODIUM CHLORIDE, POTASSIUM CHLORIDE, SODIUM LACTATE AND CALCIUM CHLORIDE 1000 ML: 600; 310; 30; 20 INJECTION, SOLUTION INTRAVENOUS at 01:02

## 2023-02-10 NOTE — ED PROVIDER NOTES
[facility] NOTE  [facility]     Admit Date: 2/10/2023  [facility] Physician: Helen Rocha  Primary OBGYN: Dr. Nikolas Boudreaux    Admit Diagnosis/Chief Complaint: Contractions  Discharge Diagnosis:  latent labor    Chief Complaint   Patient presents with    Abdominal Pain       Hospital Course:  Ana Lopez is a 22 y.o.  at 36w6d presents complaining of ctx.   This IUP is complicated by FGR.    Patient denies vaginal bleeding, leakage of fluid, headache, vision changes, RUQ pain, dysuria, fever, and nausea/vomiting.  Fetal Movement: normal.    /66   Pulse 83   Temp 98.6 °F (37 °C)   Resp 18   LMP 2022   Temp:  [98.6 °F (37 °C)] 98.6 °F (37 °C)  Pulse:  [83] 83  Resp:  [18] 18  BP: (129)/(66) 129/66    General: in no apparent distress alert oriented times 3 afebrile  Cardiovascular: regular rate and rhythm no murmurs  Respiratory: unlabored  Abdominal: soft, nontender, nondistended, no abnormal masses, no epigastric pain FHT present  Back: lumbar tenderness absent CVA tenderness none suprapubic tenderness absent  Extremeties no redness or tenderness in the calves or thighs no edema    SVE (PeriWATCH)  Dilation (cm): 0.5 Fingertip  Effacement (%): 70  Station: -3  Cervical Position: Posterior  Cervical Consistency: Soft  Haley Score: 4  Simplified Haley Score: 2         EFM: Cat 1, 145 modBTV, +accel, no decel (reassuring, reactive)  TOCO:  ctx irregular      Medical Decision Making:      LABS:   No results found for this or any previous visit (from the past 24 hour(s)).    Imaging Results    None          ASSESMENT and clinical impression: Ana Lopez is a 22 y.o.   at 36w6d with latent labor/dehydration    Discharge Diagnosis/clinical impression:   Patient Active Problem List   Diagnosis    Abdominal pain affecting pregnancy       Status:Improved    Disposition:  discharged to home    Medications:   IV fluids, tylenol    BPP ordered, if 8/8 pt will be  discharged home    Patient Instructions:   - Pt was given routine pregnancy instructions including to return to triage if she had any vaginal bleeding (other than spotting for the next 48hrs), any loss of fluid like her water broke, decreased fetal movement, or contractions Q 5min lasting for 2 or more hours. Pt was also instructed to drink copious water. Patient voiced understanding of all these instructions and was subsequently discharged home. Tylenol use and maternity belt use discussed. All questions answered. Pt left [facility] with good understanding of plan.   Preeclampsia/ROM/labor/fever/decreased FM with FKC precautions discussed, voiced understanding     She will follow up with her primary OB as scheduled    Helen Rocha MD  OB/GYN Hospitalist  12:56 AM 02/10/2023

## 2023-02-15 ENCOUNTER — ANESTHESIA EVENT (OUTPATIENT)
Dept: OBSTETRICS AND GYNECOLOGY | Facility: HOSPITAL | Age: 23
End: 2023-02-15
Payer: MEDICAID

## 2023-02-15 ENCOUNTER — HOSPITAL ENCOUNTER (INPATIENT)
Facility: HOSPITAL | Age: 23
LOS: 2 days | Discharge: HOME | End: 2023-02-17
Attending: OBSTETRICS & GYNECOLOGY | Admitting: OBSTETRICS & GYNECOLOGY
Payer: MEDICAID

## 2023-02-15 ENCOUNTER — ANESTHESIA (OUTPATIENT)
Dept: OBSTETRICS AND GYNECOLOGY | Facility: HOSPITAL | Age: 23
End: 2023-02-15
Payer: MEDICAID

## 2023-02-15 VITALS — RESPIRATION RATE: 16 BRPM

## 2023-02-15 DIAGNOSIS — Z34.90 PREGNANCY: Primary | ICD-10-CM

## 2023-02-15 DIAGNOSIS — O26.899 ABDOMINAL PAIN AFFECTING PREGNANCY: ICD-10-CM

## 2023-02-15 DIAGNOSIS — Z34.90 ENCOUNTER FOR ELECTIVE INDUCTION OF LABOR: ICD-10-CM

## 2023-02-15 DIAGNOSIS — R10.9 ABDOMINAL PAIN AFFECTING PREGNANCY: ICD-10-CM

## 2023-02-15 LAB
BASOPHILS # BLD AUTO: 0.03 X10(3)/MCL (ref 0–0.2)
BASOPHILS NFR BLD AUTO: 0.3 %
EOSINOPHIL # BLD AUTO: 0.06 X10(3)/MCL (ref 0–0.9)
EOSINOPHIL NFR BLD AUTO: 0.6 %
ERYTHROCYTE [DISTWIDTH] IN BLOOD BY AUTOMATED COUNT: 15.6 % (ref 11.5–17)
GROUP & RH: NORMAL
HCT VFR BLD AUTO: 28.3 % (ref 37–47)
HGB BLD-MCNC: 8.7 GM/DL (ref 12–16)
IMM GRANULOCYTES # BLD AUTO: 0.13 X10(3)/MCL (ref 0–0.04)
IMM GRANULOCYTES NFR BLD AUTO: 1.3 %
INDIRECT COOMBS GEL: NORMAL
LYMPHOCYTES # BLD AUTO: 1.75 X10(3)/MCL (ref 0.6–4.6)
LYMPHOCYTES NFR BLD AUTO: 17 %
MCH RBC QN AUTO: 24.6 PG
MCHC RBC AUTO-ENTMCNC: 30.7 MG/DL (ref 33–36)
MCV RBC AUTO: 79.9 FL (ref 80–94)
MONOCYTES # BLD AUTO: 0.89 X10(3)/MCL (ref 0.1–1.3)
MONOCYTES NFR BLD AUTO: 8.6 %
NEUTROPHILS # BLD AUTO: 7.44 X10(3)/MCL (ref 2.1–9.2)
NEUTROPHILS NFR BLD AUTO: 72.2 %
NRBC BLD AUTO-RTO: 0 %
PLATELET # BLD AUTO: 227 X10(3)/MCL (ref 130–400)
PMV BLD AUTO: 11.9 FL (ref 7.4–10.4)
RBC # BLD AUTO: 3.54 X10(6)/MCL (ref 4.2–5.4)
T PALLIDUM AB SER QL: NONREACTIVE
WBC # SPEC AUTO: 10.3 X10(3)/MCL (ref 4.5–11.5)

## 2023-02-15 PROCEDURE — 94761 N-INVAS EAR/PLS OXIMETRY MLT: CPT

## 2023-02-15 PROCEDURE — 25000003 PHARM REV CODE 250: Performed by: OBSTETRICS & GYNECOLOGY

## 2023-02-15 PROCEDURE — 72200005 HC VAGINAL DELIVERY LEVEL II

## 2023-02-15 PROCEDURE — 72100003 HC LABOR CARE, EA. ADDL. 8 HRS

## 2023-02-15 PROCEDURE — 37000008 HC ANESTHESIA 1ST 15 MINUTES

## 2023-02-15 PROCEDURE — 86900 BLOOD TYPING SEROLOGIC ABO: CPT | Performed by: OBSTETRICS & GYNECOLOGY

## 2023-02-15 PROCEDURE — 25000003 PHARM REV CODE 250: Performed by: ANESTHESIOLOGY

## 2023-02-15 PROCEDURE — 51702 INSERT TEMP BLADDER CATH: CPT

## 2023-02-15 PROCEDURE — 11000001 HC ACUTE MED/SURG PRIVATE ROOM

## 2023-02-15 PROCEDURE — 37000009 HC ANESTHESIA EA ADD 15 MINS

## 2023-02-15 PROCEDURE — 85025 COMPLETE CBC W/AUTO DIFF WBC: CPT | Performed by: OBSTETRICS & GYNECOLOGY

## 2023-02-15 PROCEDURE — 72100002 HC LABOR CARE, 1ST 8 HOURS

## 2023-02-15 PROCEDURE — 63600175 PHARM REV CODE 636 W HCPCS: Performed by: OBSTETRICS & GYNECOLOGY

## 2023-02-15 PROCEDURE — 62326 NJX INTERLAMINAR LMBR/SAC: CPT | Performed by: ANESTHESIOLOGY

## 2023-02-15 PROCEDURE — 86780 TREPONEMA PALLIDUM: CPT | Performed by: OBSTETRICS & GYNECOLOGY

## 2023-02-15 RX ORDER — FENTANYL/BUPIVACAINE/NS/PF 2-1250MCG
PLASTIC BAG, INJECTION (ML) INJECTION CONTINUOUS PRN
Status: DISCONTINUED | OUTPATIENT
Start: 2023-02-15 | End: 2023-02-15

## 2023-02-15 RX ORDER — MISOPROSTOL 100 UG/1
800 TABLET ORAL
Status: DISCONTINUED | OUTPATIENT
Start: 2023-02-15 | End: 2023-02-17 | Stop reason: HOSPADM

## 2023-02-15 RX ORDER — CARBOPROST TROMETHAMINE 250 UG/ML
250 INJECTION, SOLUTION INTRAMUSCULAR
Status: DISCONTINUED | OUTPATIENT
Start: 2023-02-15 | End: 2023-02-15

## 2023-02-15 RX ORDER — SIMETHICONE 80 MG
1 TABLET,CHEWABLE ORAL EVERY 6 HOURS PRN
Status: DISCONTINUED | OUTPATIENT
Start: 2023-02-15 | End: 2023-02-17 | Stop reason: HOSPADM

## 2023-02-15 RX ORDER — IBUPROFEN 600 MG/1
600 TABLET ORAL EVERY 6 HOURS PRN
Status: DISCONTINUED | OUTPATIENT
Start: 2023-02-15 | End: 2023-02-17 | Stop reason: HOSPADM

## 2023-02-15 RX ORDER — MISOPROSTOL 100 UG/1
800 TABLET ORAL ONCE AS NEEDED
Status: DISCONTINUED | OUTPATIENT
Start: 2023-02-15 | End: 2023-02-17 | Stop reason: HOSPADM

## 2023-02-15 RX ORDER — NALOXONE HCL 0.4 MG/ML
0.4 VIAL (ML) INJECTION SEE ADMIN INSTRUCTIONS
Status: DISCONTINUED | OUTPATIENT
Start: 2023-02-15 | End: 2023-02-17 | Stop reason: HOSPADM

## 2023-02-15 RX ORDER — CALCIUM CARBONATE 200(500)MG
500 TABLET,CHEWABLE ORAL 3 TIMES DAILY PRN
Status: DISCONTINUED | OUTPATIENT
Start: 2023-02-15 | End: 2023-02-17 | Stop reason: HOSPADM

## 2023-02-15 RX ORDER — HYDROCORTISONE 25 MG/G
CREAM TOPICAL 3 TIMES DAILY PRN
Status: DISCONTINUED | OUTPATIENT
Start: 2023-02-15 | End: 2023-02-17 | Stop reason: HOSPADM

## 2023-02-15 RX ORDER — DOCUSATE SODIUM 100 MG/1
200 CAPSULE, LIQUID FILLED ORAL 2 TIMES DAILY PRN
Status: DISCONTINUED | OUTPATIENT
Start: 2023-02-15 | End: 2023-02-17 | Stop reason: HOSPADM

## 2023-02-15 RX ORDER — PROCHLORPERAZINE EDISYLATE 5 MG/ML
5 INJECTION INTRAMUSCULAR; INTRAVENOUS EVERY 6 HOURS PRN
Status: DISCONTINUED | OUTPATIENT
Start: 2023-02-15 | End: 2023-02-17 | Stop reason: HOSPADM

## 2023-02-15 RX ORDER — OXYTOCIN/RINGER'S LACTATE 30/500 ML
334 PLASTIC BAG, INJECTION (ML) INTRAVENOUS ONCE
Status: DISCONTINUED | OUTPATIENT
Start: 2023-02-15 | End: 2023-02-17 | Stop reason: HOSPADM

## 2023-02-15 RX ORDER — ONDANSETRON 4 MG/1
8 TABLET, ORALLY DISINTEGRATING ORAL EVERY 8 HOURS PRN
Status: DISCONTINUED | OUTPATIENT
Start: 2023-02-15 | End: 2023-02-17 | Stop reason: HOSPADM

## 2023-02-15 RX ORDER — EPHEDRINE SULFATE 50 MG/ML
10 INJECTION, SOLUTION INTRAVENOUS ONCE AS NEEDED
Status: DISCONTINUED | OUTPATIENT
Start: 2023-02-15 | End: 2023-02-15

## 2023-02-15 RX ORDER — OXYTOCIN/RINGER'S LACTATE 30/500 ML
95 PLASTIC BAG, INJECTION (ML) INTRAVENOUS ONCE
Status: DISCONTINUED | OUTPATIENT
Start: 2023-02-15 | End: 2023-02-17 | Stop reason: HOSPADM

## 2023-02-15 RX ORDER — DEXTROSE, SODIUM CHLORIDE, SODIUM LACTATE, POTASSIUM CHLORIDE, AND CALCIUM CHLORIDE 5; .6; .31; .03; .02 G/100ML; G/100ML; G/100ML; G/100ML; G/100ML
INJECTION, SOLUTION INTRAVENOUS CONTINUOUS
Status: DISCONTINUED | OUTPATIENT
Start: 2023-02-15 | End: 2023-02-15

## 2023-02-15 RX ORDER — SIMETHICONE 80 MG
1 TABLET,CHEWABLE ORAL 4 TIMES DAILY PRN
Status: DISCONTINUED | OUTPATIENT
Start: 2023-02-15 | End: 2023-02-15

## 2023-02-15 RX ORDER — OXYTOCIN/RINGER'S LACTATE 30/500 ML
334 PLASTIC BAG, INJECTION (ML) INTRAVENOUS ONCE AS NEEDED
Status: DISCONTINUED | OUTPATIENT
Start: 2023-02-15 | End: 2023-02-17 | Stop reason: HOSPADM

## 2023-02-15 RX ORDER — SODIUM CHLORIDE, SODIUM LACTATE, POTASSIUM CHLORIDE, CALCIUM CHLORIDE 600; 310; 30; 20 MG/100ML; MG/100ML; MG/100ML; MG/100ML
INJECTION, SOLUTION INTRAVENOUS CONTINUOUS
Status: DISCONTINUED | OUTPATIENT
Start: 2023-02-15 | End: 2023-02-17 | Stop reason: HOSPADM

## 2023-02-15 RX ORDER — MEPERIDINE HYDROCHLORIDE 25 MG/ML
25 INJECTION INTRAMUSCULAR; INTRAVENOUS; SUBCUTANEOUS ONCE
Status: COMPLETED | OUTPATIENT
Start: 2023-02-15 | End: 2023-02-15

## 2023-02-15 RX ORDER — ACETAMINOPHEN 325 MG/1
650 TABLET ORAL EVERY 6 HOURS PRN
Status: DISCONTINUED | OUTPATIENT
Start: 2023-02-15 | End: 2023-02-17 | Stop reason: HOSPADM

## 2023-02-15 RX ORDER — METHYLERGONOVINE MALEATE 0.2 MG/ML
200 INJECTION INTRAVENOUS
Status: DISCONTINUED | OUTPATIENT
Start: 2023-02-15 | End: 2023-02-17 | Stop reason: HOSPADM

## 2023-02-15 RX ORDER — MUPIROCIN 20 MG/G
OINTMENT TOPICAL
Status: DISCONTINUED | OUTPATIENT
Start: 2023-02-15 | End: 2023-02-17 | Stop reason: HOSPADM

## 2023-02-15 RX ORDER — BUTORPHANOL TARTRATE 2 MG/ML
2 INJECTION INTRAMUSCULAR; INTRAVENOUS
Status: DISCONTINUED | OUTPATIENT
Start: 2023-02-15 | End: 2023-02-15

## 2023-02-15 RX ORDER — OXYTOCIN/RINGER'S LACTATE 30/500 ML
95 PLASTIC BAG, INJECTION (ML) INTRAVENOUS ONCE AS NEEDED
Status: DISCONTINUED | OUTPATIENT
Start: 2023-02-15 | End: 2023-02-17 | Stop reason: HOSPADM

## 2023-02-15 RX ORDER — SODIUM CITRATE AND CITRIC ACID MONOHYDRATE 334; 500 MG/5ML; MG/5ML
30 SOLUTION ORAL ONCE
Status: DISCONTINUED | OUTPATIENT
Start: 2023-02-15 | End: 2023-02-17 | Stop reason: HOSPADM

## 2023-02-15 RX ORDER — DIPHENHYDRAMINE HCL 25 MG
25 CAPSULE ORAL EVERY 4 HOURS PRN
Status: DISCONTINUED | OUTPATIENT
Start: 2023-02-15 | End: 2023-02-17 | Stop reason: HOSPADM

## 2023-02-15 RX ORDER — SODIUM CHLORIDE 0.9 % (FLUSH) 0.9 %
10 SYRINGE (ML) INJECTION
Status: DISCONTINUED | OUTPATIENT
Start: 2023-02-15 | End: 2023-02-17 | Stop reason: HOSPADM

## 2023-02-15 RX ORDER — OXYTOCIN 10 [USP'U]/ML
10 INJECTION, SOLUTION INTRAMUSCULAR; INTRAVENOUS ONCE AS NEEDED
Status: DISCONTINUED | OUTPATIENT
Start: 2023-02-15 | End: 2023-02-17 | Stop reason: HOSPADM

## 2023-02-15 RX ORDER — DIPHENHYDRAMINE HYDROCHLORIDE 50 MG/ML
25 INJECTION INTRAMUSCULAR; INTRAVENOUS EVERY 4 HOURS PRN
Status: DISCONTINUED | OUTPATIENT
Start: 2023-02-15 | End: 2023-02-17 | Stop reason: HOSPADM

## 2023-02-15 RX ORDER — DIPHENOXYLATE HYDROCHLORIDE AND ATROPINE SULFATE 2.5; .025 MG/1; MG/1
1 TABLET ORAL 4 TIMES DAILY PRN
Status: DISCONTINUED | OUTPATIENT
Start: 2023-02-15 | End: 2023-02-17 | Stop reason: HOSPADM

## 2023-02-15 RX ORDER — OXYTOCIN 10 [USP'U]/ML
10 INJECTION, SOLUTION INTRAMUSCULAR; INTRAVENOUS ONCE AS NEEDED
Status: DISCONTINUED | OUTPATIENT
Start: 2023-02-15 | End: 2023-02-15

## 2023-02-15 RX ORDER — OXYTOCIN/RINGER'S LACTATE 30/500 ML
0-30 PLASTIC BAG, INJECTION (ML) INTRAVENOUS CONTINUOUS
Status: DISCONTINUED | OUTPATIENT
Start: 2023-02-15 | End: 2023-02-17 | Stop reason: HOSPADM

## 2023-02-15 RX ORDER — MEPERIDINE HYDROCHLORIDE 25 MG/ML
25 INJECTION INTRAMUSCULAR; INTRAVENOUS; SUBCUTANEOUS ONCE
Status: DISCONTINUED | OUTPATIENT
Start: 2023-02-15 | End: 2023-02-15

## 2023-02-15 RX ORDER — CARBOPROST TROMETHAMINE 250 UG/ML
250 INJECTION, SOLUTION INTRAMUSCULAR
Status: DISCONTINUED | OUTPATIENT
Start: 2023-02-15 | End: 2023-02-17 | Stop reason: HOSPADM

## 2023-02-15 RX ORDER — LIDOCAINE HYDROCHLORIDE 10 MG/ML
1 INJECTION, SOLUTION EPIDURAL; INFILTRATION; INTRACAUDAL; PERINEURAL ONCE
Status: DISCONTINUED | OUTPATIENT
Start: 2023-02-15 | End: 2023-02-17 | Stop reason: HOSPADM

## 2023-02-15 RX ORDER — BUPIVACAINE HYDROCHLORIDE 2.5 MG/ML
INJECTION, SOLUTION EPIDURAL; INFILTRATION; INTRACAUDAL
Status: COMPLETED | OUTPATIENT
Start: 2023-02-15 | End: 2023-02-15

## 2023-02-15 RX ORDER — OXYTOCIN/RINGER'S LACTATE 30/500 ML
95 PLASTIC BAG, INJECTION (ML) INTRAVENOUS ONCE AS NEEDED
Status: DISCONTINUED | OUTPATIENT
Start: 2023-02-15 | End: 2023-02-15

## 2023-02-15 RX ORDER — PRENATAL WITH FERROUS FUM AND FOLIC ACID 3080; 920; 120; 400; 22; 1.84; 3; 20; 10; 1; 12; 200; 27; 25; 2 [IU]/1; [IU]/1; MG/1; [IU]/1; MG/1; MG/1; MG/1; MG/1; MG/1; MG/1; UG/1; MG/1; MG/1; MG/1; MG/1
1 TABLET ORAL DAILY
Status: DISCONTINUED | OUTPATIENT
Start: 2023-02-16 | End: 2023-02-17 | Stop reason: HOSPADM

## 2023-02-15 RX ORDER — ONDANSETRON 2 MG/ML
4 INJECTION INTRAMUSCULAR; INTRAVENOUS EVERY 6 HOURS PRN
Status: DISCONTINUED | OUTPATIENT
Start: 2023-02-15 | End: 2023-02-17 | Stop reason: HOSPADM

## 2023-02-15 RX ORDER — OXYCODONE AND ACETAMINOPHEN 5; 325 MG/1; MG/1
1 TABLET ORAL EVERY 4 HOURS PRN
Status: DISCONTINUED | OUTPATIENT
Start: 2023-02-15 | End: 2023-02-17 | Stop reason: HOSPADM

## 2023-02-15 RX ORDER — LIDOCAINE HYDROCHLORIDE 10 MG/ML
10 INJECTION INFILTRATION; PERINEURAL ONCE AS NEEDED
Status: DISCONTINUED | OUTPATIENT
Start: 2023-02-15 | End: 2023-02-17 | Stop reason: HOSPADM

## 2023-02-15 RX ORDER — BUTORPHANOL TARTRATE 2 MG/ML
1 INJECTION INTRAMUSCULAR; INTRAVENOUS
Status: DISCONTINUED | OUTPATIENT
Start: 2023-02-15 | End: 2023-02-15

## 2023-02-15 RX ADMIN — BUPIVACAINE HYDROCHLORIDE 7 ML: 2.5 INJECTION, SOLUTION EPIDURAL; INFILTRATION; INTRACAUDAL; PERINEURAL at 01:02

## 2023-02-15 RX ADMIN — Medication 12 ML/HR: at 01:02

## 2023-02-15 RX ADMIN — IBUPROFEN 600 MG: 600 TABLET, FILM COATED ORAL at 07:02

## 2023-02-15 RX ADMIN — MEPERIDINE HYDROCHLORIDE 25 MG: 25 INJECTION INTRAMUSCULAR; INTRAVENOUS; SUBCUTANEOUS at 11:02

## 2023-02-15 RX ADMIN — OXYCODONE HYDROCHLORIDE AND ACETAMINOPHEN 1 TABLET: 5; 325 TABLET ORAL at 08:02

## 2023-02-15 RX ADMIN — Medication 2 MILLI-UNITS/MIN: at 11:02

## 2023-02-15 RX ADMIN — DINOPROSTONE 10 MG: 10 INSERT VAGINAL at 01:02

## 2023-02-15 NOTE — ANESTHESIA PREPROCEDURE EVALUATION
02/15/2023  Eugene Lopez is a 22 y.o., female IUP @3cm and 10/10 pain.    Last 3 sets of Vitals    Vitals - 1 value per visit 2/15/2023 2/15/2023 2/15/2023   SYSTOLIC - 130 -   DIASTOLIC - 80 -   Pulse 72 82 -   Temp - - -   Resp - - 16   SPO2 100 - -   Weight (lb) - - -   Weight (kg) - - -   Height - - -   BMI (Calculated) - - -   VISIT REPORT - - -         Lab Results   Component Value Date    WBC 10.3 02/15/2023    HGB 8.7 (L) 02/15/2023    HCT 28.3 (L) 02/15/2023    MCV 79.9 (L) 02/15/2023     02/15/2023          BMP  Lab Results   Component Value Date     01/18/2023    K 4.1 01/18/2023    CO2 25 01/18/2023    BUN 6.4 (L) 01/18/2023    CREATININE 0.52 (L) 01/18/2023    CALCIUM 9.0 01/18/2023    EGFRNONAA >60 08/10/2021      Pre-op Assessment    I have reviewed the Patient Summary Reports.     I have reviewed the Nursing Notes. I have reviewed the NPO Status.   I have reviewed the Medications.     Review of Systems  Anesthesia Hx:  No problems with previous Anesthesia  History of prior surgery of interest to airway management or planning:   Cardiovascular:   Hypertension  Functional Capacity good / => 4 METS    Pulmonary:  Pulmonary Normal    OB/GYN/PEDS:  Planned Vaginal Delivery    Neurological:  Neurology Normal        Physical Exam  General: Well nourished, Cooperative, Alert and Oriented    Airway:  Mallampati: II   Mouth Opening: Normal  TM Distance: Normal  Tongue: Normal  Neck ROM: Normal ROM    Dental:  Intact    Chest/Lungs:  Clear to auscultation, Normal Respiratory Rate    Heart:  Rate: Normal  Rhythm: Regular Rhythm        Anesthesia Plan  Type of Anesthesia, risks & benefits discussed:    Anesthesia Type: Epidural  Post Op Pain Control Plan: epidural analgesia  Informed Consent: Informed consent signed with the Patient and all parties understand the risks and agree with  anesthesia plan.  All questions answered.   ASA Score: 2  Day of Surgery Review of History & Physical: H&P Update referred to the surgeon/provider.    Ready For Surgery From Anesthesia Perspective.     .

## 2023-02-15 NOTE — ANESTHESIA PROCEDURE NOTES
Epidural    Patient location during procedure: OB   Reason for block: primary anesthetic   Reason for block: labor analgesia requested by patient and obstetrician  Diagnosis: IUP in labor   Start time: 2/15/2023 1:33 PM  Timeout: 2/15/2023 1:32 PM  End time: 2/15/2023 1:38 PM    Staffing  Performing Provider: Arnulfo Morales Jr., MD  Authorizing Provider: Arnulfo Morales Jr., MD        Preanesthetic Checklist  Completed: patient identified, IV checked, site marked, risks and benefits discussed, surgical consent, monitors and equipment checked, pre-op evaluation, timeout performed, anesthesia consent given, hand hygiene performed and patient being monitored  Preparation  Patient position: sitting  Prep: ChloraPrep  Patient monitoring: Pulse Ox and Blood Pressure  Reason for block: primary anesthetic   Epidural  Skin Anesthetic: lidocaine 1%  Skin Wheal: 4 mL  Administration type: continuous  Approach: midline  Interspace: L4-5    Injection technique: LOREN air and LOREN saline  Needle and Epidural Catheter  Needle type: Tuohy   Needle gauge: 17  Needle length: 3.5 inches  Catheter type: springwI3 Precision  Catheter size: 19 G  Insertion Attempts: 1  Test dose: 3 mL of lidocaine 1.5% with Epi 1-to-200,000  Additional Documentation: incremental injection, no paresthesia on injection, no significant pain on injection, negative aspiration for heme and CSF, no signs/symptoms of IV or SA injection and no significant complaints from patient  Needle localization: anatomical landmarks  Assessment  Upper dermatomal levels - Left: T8  Right: T8   Dermatomal levels determined by ice  Ease of block: easy  Patient's tolerance of the procedure: comfortable throughout block and no complaints  Additional Notes  Postdural puncture w/ 5in 25g Rosie    Initial Epidrual bolus diluted w/ 7cc PFNS No inadvertent dural puncture with Tuohy.  Dural puncture performed with spinal needle.      Medications:    Medications: bupivacaine (pf) (MARCAINE)  injection 0.25% - Epidural   7 mL - 2/15/2023 1:41:00 PM

## 2023-02-15 NOTE — L&D DELIVERY NOTE
[facility] - Labor and Delivery  Vaginal Delivery   Operative Note    SUMMARY     Normal spontaneous vaginal delivery of live infant, was placed on mothers abdomen for skin to skin and bulb suctioning performed.  Infant delivered position SHAHBAZ over intact perineum.  Nuchal cord: Yes, cord reduced at perineum.    Spontaneous delivery of placenta and IV pitocin given noting good uterine tone.  2nd degree laceration noted and repaired in normal fashion with vicyl .  Patient tolerated delivery well. Sponge needle and lap counted correctly x2.    Indications: <principal problem not specified>  Pregnancy complicated by:   Patient Active Problem List   Diagnosis    Abdominal pain affecting pregnancy     Admitting GA: 37w4d    Delivery Information for Ranjan Lopez    Birth information:  YOB: 2022   Time of birth: 5:18 PM   Sex: male   Head Delivery Date/Time: 2/15/2023  5:18 PM   Delivery type: Vaginal, Spontaneous   Gestational Age: 37w4d    Delivery Providers    Delivering clinician: Nikolas Boudreaux MD   Provider Role    Henrietta Baez RN Registered Nurse    Flori Jarrett RN Registered Nurse    Yash Fabian RN Registered Nurse              Measurements    Weight:   Length:          Apgars    Living status: Living  Apgars:  1 min.:  5 min.:  10 min.:  15 min.:  20 min.:    Skin color:         Heart rate:         Reflex irritability:         Muscle tone:         Respiratory effort:         Total:                  Operative Delivery    Forceps attempted?: No  Vacuum extractor attempted?: No         Shoulder Dystocia    Shoulder dystocia present?: No           Presentation    Presentation: Vertex  Position: Middle Occiput Anterior           Interventions/Resuscitation    Method: Tactile Stimulation, Bulb Suctioning       Cord    Vessels: 3 vessels  Complications: Nuchal  Nuchal Intervention: reduced  Nuchal Cord Description: tight nuchal cord  Number of Loops: 2  Delayed Cord  Clamping?: No  Cord Blood Disposition: Sent with Baby  Gases Sent?: No  Stem Cell Collection (by MD): No       Placenta    Placenta delivery date/time: 2/15/2023 1722  Placenta removal: Spontaneous  Placenta appearance: Intact  Placenta disposition: Discarded           Labor Events:       labor: No     Labor Onset Date/Time: 02/15/2023       Dilation Complete Date/Time: 02/15/2023 17:03     Start Pushing Date/Time: 02/15/2023 17:08     Rupture Date/Time: 02/15/23  1115         Rupture type: ARM (Artificial Rupture)           Fluid Amount:         Fluid Color: Clear         steroids:       Antibiotics given for GBS: No     Induction:       Indications for induction:        Augmentation:       Indications for augmentation:       Labor complications:       Additional complications:          Cervical ripening:                     Delivery:      Episiotomy: None     Indication for Episiotomy:       Perineal Lacerations: 2nd Repaired:  Yes   Periurethral Laceration:   Repaired:     Labial Laceration:   Repaired:     Sulcus Laceration:   Repaired:     Vaginal Laceration:   Repaired:     Cervical Laceration:   Repaired:     Repair suture:       Repair # of packets: 3     Last Value - EBL - Nursing (mL):       Sum - EBL - Nursing (mL): 0     Last Value - EBL - Anesthesia (mL):        Calculated QBL (mL):         Vaginal Sweep Performed: No     Surgicount Correct: Yes       Other providers:       Anesthesia    Method: Epidural          Details (if applicable):  Trial of Labor      Categorization:      Priority:     Indications for :     Incision Type:       Additional  information:  Forceps:    Vacuum:    Breech:    Observed anomalies    Other (Comments):

## 2023-02-15 NOTE — H&P
[facility] - Labor and Delivery  Obstetrics  History & Physical    Patient Name: Eugene Lopez  MRN: [id number]  Admission Date: 2/15/2023  Primary Care Provider: KATHI Alonzo    Subjective:     Principal Problem:FGR w grant cisterna magna for IOL      History of Present Illness: fgr    Obstetric HPI:  Patient reports None contractions, active fetal movement, No vaginal bleeding , No loss of fluid     This pregnancy has been complicated by grant cisterna magna    OB History    Para Term  AB Living   2 0 0 0 1 0   SAB IAB Ectopic Multiple Live Births   1 0 0 0 0      # Outcome Date GA Lbr Bello/2nd Weight Sex Delivery Anes PTL Lv   2 Current            1 SAB      SAB        Past Medical History:   Diagnosis Date    Bilateral ovarian cysts     Fibroids     Hypertension      Past Surgical History:   Procedure Laterality Date    APPENDECTOMY      BUNIONECTOMY      hem      tonsilectomy         PTA Medications   Medication Sig    amitriptyline (ELAVIL) 25 MG tablet TAKE 1 TABLET BY MOUTH ONCE DAILY AT NIGHT AT BEDTIME    cetirizine (ZYRTEC) 10 MG tablet Take 10 mg by mouth.    ferrous sulfate 325 (65 FE) MG EC tablet Take 1 tablet (325 mg total) by mouth once daily.    fluticasone propionate (FLONASE) 50 mcg/actuation nasal spray by Nasal route.    gabapentin (NEURONTIN) 300 MG capsule Take 300 mg by mouth 2 (two) times daily.    lactulose (CHRONULAC) 10 gram/15 mL solution Take 10 g by mouth.    lisinopriL (PRINIVIL,ZESTRIL) 20 MG tablet Take 20 mg by mouth.    omeprazole (PRILOSEC) 20 MG capsule Take 1 capsule (20 mg total) by mouth once daily.    pantoprazole (PROTONIX) 40 MG tablet Take 40 mg by mouth.    prenatal vit,calc78-iron-folic 29-1 mg Tab Take 1 tablet by mouth daily    PRENATE CHEWABLE 1 mg Chew Take 1 tablet by mouth once daily.       Review of patient's allergies indicates:  No Known Allergies     Family History    None       Tobacco Use    Smoking status: Never     Smokeless tobacco: Never   Substance and Sexual Activity    Alcohol use: Never    Drug use: Never    Sexual activity: Not Currently     Review of Systems   Objective:     Vital Signs (Most Recent):  Temp: 98.7 °F (37.1 °C) (02/15/23 0601)  Pulse: 76 (02/15/23 06)  Resp: 16 (02/15/23 0601)  BP: 120/73 (02/15/23 0601)  SpO2: 98 % (02/15/23 0600) Vital Signs (24h Range):  Temp:  [98.7 °F (37.1 °C)-98.8 °F (37.1 °C)] 98.7 °F (37.1 °C)  Pulse:  [76-92] 76  Resp:  [16] 16  SpO2:  [98 %-100 %] 98 %  BP: (115-120)/(63-73) 120/73        There is no height or weight on file to calculate BMI.    FHT: 150Cat 1 (reassuring)  TOCO:  Q 0 minutes    Physical Exam    Cervix:  Dilation:  0  Effacement:  50%  Station: -3  Presentation: Vertex     Significant Labs:  Lab Results   Component Value Date    GROUPTRH A POS 02/15/2023    STREPBCULT neg 2023    .8 10/14/2022       I have personallly reviewed all pertinent lab results from the last 24 hours.    Assessment/Plan:     22 y.o. female  at 37w4d for:    There are no hospital problems to display for this patient.      Admit to lnd for iol    Nikolas Boudreaux MD  Obstetrics  [facility] - Labor and Delivery

## 2023-02-16 LAB
BASOPHILS # BLD AUTO: 0.04 X10(3)/MCL (ref 0–0.2)
BASOPHILS NFR BLD AUTO: 0.3 %
EOSINOPHIL # BLD AUTO: 0.02 X10(3)/MCL (ref 0–0.9)
EOSINOPHIL NFR BLD AUTO: 0.1 %
ERYTHROCYTE [DISTWIDTH] IN BLOOD BY AUTOMATED COUNT: 15.9 % (ref 11.5–17)
HCT VFR BLD AUTO: 32.6 % (ref 37–47)
HGB BLD-MCNC: 10.1 GM/DL (ref 12–16)
IMM GRANULOCYTES # BLD AUTO: 0.12 X10(3)/MCL (ref 0–0.04)
IMM GRANULOCYTES NFR BLD AUTO: 0.8 %
LYMPHOCYTES # BLD AUTO: 1.42 X10(3)/MCL (ref 0.6–4.6)
LYMPHOCYTES NFR BLD AUTO: 9.4 %
MCH RBC QN AUTO: 25.3 PG
MCHC RBC AUTO-ENTMCNC: 31 MG/DL (ref 33–36)
MCV RBC AUTO: 81.5 FL (ref 80–94)
MONOCYTES # BLD AUTO: 1.07 X10(3)/MCL (ref 0.1–1.3)
MONOCYTES NFR BLD AUTO: 7.1 %
NEUTROPHILS # BLD AUTO: 12.38 X10(3)/MCL (ref 2.1–9.2)
NEUTROPHILS NFR BLD AUTO: 82.3 %
NRBC BLD AUTO-RTO: 0 %
PLATELET # BLD AUTO: 239 X10(3)/MCL (ref 130–400)
PMV BLD AUTO: 12.4 FL (ref 7.4–10.4)
RBC # BLD AUTO: 4 X10(6)/MCL (ref 4.2–5.4)
WBC # SPEC AUTO: 15.1 X10(3)/MCL (ref 4.5–11.5)

## 2023-02-16 PROCEDURE — 11000001 HC ACUTE MED/SURG PRIVATE ROOM

## 2023-02-16 PROCEDURE — 25000003 PHARM REV CODE 250: Performed by: OBSTETRICS & GYNECOLOGY

## 2023-02-16 PROCEDURE — 63600175 PHARM REV CODE 636 W HCPCS: Performed by: OBSTETRICS & GYNECOLOGY

## 2023-02-16 PROCEDURE — 85025 COMPLETE CBC W/AUTO DIFF WBC: CPT | Performed by: OBSTETRICS & GYNECOLOGY

## 2023-02-16 PROCEDURE — 90471 IMMUNIZATION ADMIN: CPT | Performed by: OBSTETRICS & GYNECOLOGY

## 2023-02-16 PROCEDURE — 90715 TDAP VACCINE 7 YRS/> IM: CPT | Performed by: OBSTETRICS & GYNECOLOGY

## 2023-02-16 RX ORDER — ADHESIVE BANDAGE
30 BANDAGE TOPICAL DAILY PRN
Status: DISCONTINUED | OUTPATIENT
Start: 2023-02-16 | End: 2023-02-17 | Stop reason: HOSPADM

## 2023-02-16 RX ADMIN — MAGNESIUM HYDROXIDE 2400 MG: 400 SUSPENSION ORAL at 03:02

## 2023-02-16 RX ADMIN — IBUPROFEN 600 MG: 600 TABLET, FILM COATED ORAL at 04:02

## 2023-02-16 RX ADMIN — TETANUS TOXOID, REDUCED DIPHTHERIA TOXOID AND ACELLULAR PERTUSSIS VACCINE, ADSORBED 0.5 ML: 5; 2.5; 8; 8; 2.5 SUSPENSION INTRAMUSCULAR at 05:02

## 2023-02-16 RX ADMIN — HYDROCORTISONE 2.5%: 25 CREAM TOPICAL at 11:02

## 2023-02-16 RX ADMIN — IBUPROFEN 600 MG: 600 TABLET, FILM COATED ORAL at 09:02

## 2023-02-16 RX ADMIN — BENZOCAINE AND LEVOMENTHOL: 200; 5 SPRAY TOPICAL at 09:02

## 2023-02-16 RX ADMIN — OXYCODONE HYDROCHLORIDE AND ACETAMINOPHEN 1 TABLET: 5; 325 TABLET ORAL at 09:02

## 2023-02-16 RX ADMIN — OXYCODONE HYDROCHLORIDE AND ACETAMINOPHEN 1 TABLET: 5; 325 TABLET ORAL at 01:02

## 2023-02-16 RX ADMIN — OXYCODONE HYDROCHLORIDE AND ACETAMINOPHEN 1 TABLET: 5; 325 TABLET ORAL at 07:02

## 2023-02-16 RX ADMIN — DOCUSATE SODIUM 200 MG: 100 CAPSULE, LIQUID FILLED ORAL at 07:02

## 2023-02-16 NOTE — PROGRESS NOTES
Pt ambulated to bathroom & urinated without difficulty. Perineum cleansed & pads changed, dermaplast & tuks applied.

## 2023-02-16 NOTE — PROGRESS NOTES
Pt seen this am no complaints. Min lochia  Vss afebrile  Aao3  Abd nt nd  Ext nt  Sp  pp1 doing well

## 2023-02-16 NOTE — ANESTHESIA POSTPROCEDURE EVALUATION
Anesthesia Post Evaluation    Patient: Eugene Lopez    Procedure(s) Performed: * No procedures listed *    Final Anesthesia Type: general (/Regional//MAC)      Patient location during evaluation: PACU  Post-procedure mental status: @ basline.  Post-procedure vital signs: reviewed and stable  Pain management: adequate    PONV status: See postop meds for drugs used to control n/v if any.  Anesthetic complications: no      Cardiovascular status: blood pressure returned to baseline  Respiratory status: @ baseline.  Hydration status: euvolemic        I explained anesthesia plan to patient/responsbile party if available.  Anesthesia consent done going over the material facts, risks, complications & alternatives, obtained which includes the possibility of altering the anesthesia plan.  I reviewed problem list, appropriate labs, any workup, Xray, EKG etc noted below.  Patients condition is satisfactory to proceed with anesthesia plan unless otherwise noted (see anesthesia chart for details of the anesthesia plan carried out).      Pre-operative evaluation for * No procedures listed *    /65   Pulse 85   Temp 36.6 °C (97.9 °F) (Oral)   Resp 18   LMP 05/28/2022   SpO2 95%   Breastfeeding No     Patient Active Problem List   Diagnosis    Abdominal pain affecting pregnancy       Review of patient's allergies indicates:  No Known Allergies    Current Outpatient Medications   Medication Instructions    amitriptyline (ELAVIL) 25 MG tablet TAKE 1 TABLET BY MOUTH ONCE DAILY AT NIGHT AT BEDTIME    cetirizine (ZYRTEC) 10 mg, Oral    ferrous sulfate 325 mg, Oral, Daily    fluticasone propionate (FLONASE) 50 mcg/actuation nasal spray Nasal    gabapentin (NEURONTIN) 300 mg, Oral, 2 times daily    lactulose (CHRONULAC) 10 g, Oral    lisinopriL (PRINIVIL,ZESTRIL) 20 mg, Oral    omeprazole (PRILOSEC) 20 mg, Oral, Daily    pantoprazole (PROTONIX) 40 mg, Oral    prenatal vit,calc78-iron-folic 29-1 mg Tab Take 1  tablet by mouth daily    PRENATE CHEWABLE 1 mg Chew 1 tablet, Oral, Daily       Past Surgical History:   Procedure Laterality Date    APPENDECTOMY      BUNIONECTOMY      hem      tonsilectomy         Social History     Socioeconomic History    Marital status: Single   Tobacco Use    Smoking status: Never    Smokeless tobacco: Never   Substance and Sexual Activity    Alcohol use: Never    Drug use: Never    Sexual activity: Not Currently   Social History Narrative    ** Merged History Encounter **            Lab Results   Component Value Date    WBC 15.1 (H) 02/16/2023    HGB 10.1 (L) 02/16/2023    HCT 32.6 (L) 02/16/2023    MCV 81.5 02/16/2023     02/16/2023          BMP  Lab Results   Component Value Date    HCT 32.6 (L) 02/16/2023     01/18/2023    K 4.1 01/18/2023    BUN 6.4 (L) 01/18/2023    CREATININE 0.52 (L) 01/18/2023    CALCIUM 9.0 01/18/2023        INR  No results for input(s): PT, INR, PROTIME, APTT in the last 72 hours.        Diagnostic Studies:      EKG:  No results found for this or any previous visit.           Vitals Value Taken Time   /65 02/16/23 1558   Temp 36.6 °C (97.9 °F) 02/16/23 1558   Pulse 85 02/16/23 1558   Resp 18 02/16/23 1325   SpO2 84 % 02/15/23 1932   Vitals shown include unvalidated device data.      No case tracking events are documented in the log.      Pain/Garfield Score: Pain Rating Prior to Med Admin: 8 (2/16/2023  2:10 PM)  Pain Rating Post Med Admin: 3 (2/16/2023  2:10 PM)

## 2023-02-17 PROBLEM — Z34.90 PREGNANCY: Status: ACTIVE | Noted: 2023-02-17

## 2023-02-17 PROCEDURE — 25000003 PHARM REV CODE 250: Performed by: OBSTETRICS & GYNECOLOGY

## 2023-02-17 RX ORDER — OXYCODONE AND ACETAMINOPHEN 5; 325 MG/1; MG/1
1 TABLET ORAL EVERY 6 HOURS PRN
Qty: 15 TABLET | Refills: 0 | Status: SHIPPED | OUTPATIENT
Start: 2023-02-17 | End: 2023-05-24

## 2023-02-17 RX ADMIN — OXYCODONE HYDROCHLORIDE AND ACETAMINOPHEN 1 TABLET: 5; 325 TABLET ORAL at 11:02

## 2023-02-17 RX ADMIN — OXYCODONE HYDROCHLORIDE AND ACETAMINOPHEN 1 TABLET: 5; 325 TABLET ORAL at 07:02

## 2023-02-17 RX ADMIN — IBUPROFEN 600 MG: 600 TABLET, FILM COATED ORAL at 09:02

## 2023-02-17 RX ADMIN — IBUPROFEN 600 MG: 600 TABLET, FILM COATED ORAL at 04:02

## 2023-02-17 RX ADMIN — OXYCODONE HYDROCHLORIDE AND ACETAMINOPHEN 1 TABLET: 5; 325 TABLET ORAL at 02:02

## 2023-02-17 RX ADMIN — DOCUSATE SODIUM 200 MG: 100 CAPSULE, LIQUID FILLED ORAL at 07:02

## 2023-02-17 RX ADMIN — IBUPROFEN 600 MG: 600 TABLET, FILM COATED ORAL at 10:02

## 2023-02-17 RX ADMIN — PRENATAL VITAMINS-IRON FUMARATE 27 MG IRON-FOLIC ACID 0.8 MG TABLET 1 TABLET: at 07:02

## 2023-02-17 NOTE — PROGRESS NOTES
Copied from baby's chart::  .. Admit Assessment    Patient Identification  Ranjan Lopez   :  2/15/2022  Admit Date:  2/15/2023  Attending Provider:  Abdifatah Moreno MD              Referral:    received case management consult for meconium drug screen assessment.    Verified her face sheet information:      Living Situation:      Resides at [address] [facility] [address] [place] [address], phone: [phone] (home).           Met with mother and FOB in postpartum room for MDS consult, MDS ordered due to mother's UDS+ THC 2022. Both mother and FOB were appropriate and engaged throughout assessment. Baby's Name: Juan Pablo Garza. FOB: Conana Garza (involved). Mother reports that she lives at above confirmed address with FOB, she reports that this is their first child. OB: Dr. Nikolas Boudreaux, Pedi: Dr. Moreno. Mother reports to being on WIC throughout her pregnancy and is aware to call for follow up appt. Mother reports to plan to breast and formula feed. Mother reports to having all necessary baby supplies including a carseat and crib, discussed safe sleep and provided mother with safe sleep education resources as well as parent resource packet.       History/Current Symptoms of Anxiety/Depression: denied  Discussed PPD and identifying symptoms and provided mom with PPD counseling resources and symptom brochure.       Identified Support:  FOB, mother's parents, FOB's mother, siblings, parents report to having large, strong support system      History/Current Substance Use: denied     Indications of Abuse/Neglect:  no indications         Emotional/Behavioral/Cognitive Issues: none present at time of assessment      Current RX Prescriptions: mother reports to being on iron supplements, folic acid, baby aspirin and PNV during pregnancy     Adequate Discharge Transportation: yes, FOB    Mom's UDS: + cannabis 2022, none collected on admit    Baby's UDS:  negative, MDS pending     DCFS status: no indications for report at this time, MDS pending, will follow MDS results and make DCFS report if necessary       Plan: will follow MDS results and make DCFS report if necessary

## 2023-02-17 NOTE — DISCHARGE SUMMARY
[facility] - 2nd Floor Mother/Baby Unit  Obstetrics  Discharge Summary      Patient Name: Eugene Lopez  MRN: [id number]  Admission Date: 2/15/2023  Hospital Length of Stay: 2 days  Discharge Date and Time:  02/17/2023 9:01 AM  Attending Physician: Nikolas Boudreaux MD   Discharging Provider: Nikolas Boudreaux MD   Primary Care Provider: KATHI Alonzo        * No surgery found *     Hospital Course:   No notes on file         Final Active Diagnoses:    Diagnosis Date Noted POA    PRINCIPAL PROBLEM:  Pregnancy [Z34.90] 02/17/2023 Not Applicable      Problems Resolved During this Admission:        Significant Diagnostic Studies: Labs: All labs within the past 24 hours have been reviewed      Feeding Method: both breast and bottle    Immunizations       Date Immunization Status Dose Route/Site Given by    02/15/23 1813 MMR Incomplete 0.5 mL Subcutaneous/     02/16/23 1738 Tdap Deleted 0.5 mL Intramuscular/     02/16/23 1736 Tdap Given 0.5 mL Intramuscular/Left deltoid Brit Cr LPN            Delivery:    Episiotomy: None   Lacerations: 2nd   Repair suture:     Repair # of packets: 3   Blood loss (ml):       Birth information:  YOB: 2022   Time of birth: 5:18 PM   Sex: male   Delivery type: Vaginal, Spontaneous   Gestational Age: 37w4d    Delivery Clinician:      Other providers:       Additional  information:  Forceps:    Vacuum:    Breech:    Observed anomalies      Living?:           APGARS  One minute Five minutes Ten minutes   Skin color:         Heart rate:         Grimace:         Muscle tone:         Breathing:         Totals: 8  9        Placenta: Delivered:       appearance  Pending Diagnostic Studies:       None            Discharged Condition: stable    Disposition: Home or Self Care    Follow Up:    Patient Instructions:   No discharge procedures on file.  Medications:  Current Discharge Medication List        START taking these medications    Details    oxyCODONE-acetaminophen (PERCOCET) 5-325 mg per tablet Take 1 tablet by mouth every 6 (six) hours as needed for Pain.  Qty: 15 tablet, Refills: 0    Comments: Quantity prescribed more than 7 day supply? Yes, quantity medically necessary           CONTINUE these medications which have NOT CHANGED    Details   amitriptyline (ELAVIL) 25 MG tablet TAKE 1 TABLET BY MOUTH ONCE DAILY AT NIGHT AT BEDTIME      cetirizine (ZYRTEC) 10 MG tablet Take 10 mg by mouth.      ferrous sulfate 325 (65 FE) MG EC tablet Take 1 tablet (325 mg total) by mouth once daily.  Qty: 30 tablet, Refills: 3    Associated Diagnoses: Encounter for supervision of normal first pregnancy in third trimester      fluticasone propionate (FLONASE) 50 mcg/actuation nasal spray by Nasal route.      gabapentin (NEURONTIN) 300 MG capsule Take 300 mg by mouth 2 (two) times daily.      lactulose (CHRONULAC) 10 gram/15 mL solution Take 10 g by mouth.      lisinopriL (PRINIVIL,ZESTRIL) 20 MG tablet Take 20 mg by mouth.      omeprazole (PRILOSEC) 20 MG capsule Take 1 capsule (20 mg total) by mouth once daily.  Qty: 30 capsule, Refills: 1    Associated Diagnoses: Epigastric pain      pantoprazole (PROTONIX) 40 MG tablet Take 40 mg by mouth.      prenatal vit,calc78-iron-folic 29-1 mg Tab Take 1 tablet by mouth daily  Qty: 30 tablet, Refills: 11    Associated Diagnoses: First trimester pregnancy      PRENATE CHEWABLE 1 mg Chew Take 1 tablet by mouth once daily.             Nikolas Boudreaux MD  Obstetrics  [facility] - 2nd Floor Mother/Baby Unit

## 2023-02-17 NOTE — PLAN OF CARE
"  Problem: Adult Inpatient Plan of Care  Goal: Plan of Care Review  Outcome: Ongoing, Progressing  Goal: Patient-Specific Goal (Individualized)  Outcome: Ongoing, Progressing  Flowsheets (Taken 2/16/2023 2216)  Individualized Care Needs: "I want to formula feed my baby."  Goal: Absence of Hospital-Acquired Illness or Injury  Outcome: Ongoing, Progressing  Goal: Optimal Comfort and Wellbeing  Outcome: Ongoing, Progressing  Goal: Readiness for Transition of Care  Outcome: Ongoing, Progressing     "

## 2023-02-17 NOTE — PROGRESS NOTES
Attempted to meet with mother for MDS assessment, guest in room reports that mother is currently in restroom, will attempt to meet with mother again at a later time.

## 2023-02-19 VITALS
TEMPERATURE: 98 F | RESPIRATION RATE: 16 BRPM | SYSTOLIC BLOOD PRESSURE: 110 MMHG | OXYGEN SATURATION: 95 % | DIASTOLIC BLOOD PRESSURE: 67 MMHG | HEART RATE: 93 BPM

## 2023-02-20 ENCOUNTER — PATIENT OUTREACH (OUTPATIENT)
Dept: ADMINISTRATIVE | Facility: CLINIC | Age: 23
End: 2023-02-20
Payer: MEDICAID

## 2023-02-20 NOTE — PROGRESS NOTES
C3 nurse attempted to contact Eugene Lopez  for a TCC post hospital discharge follow up call. No answer. Left voicemail with callback information. The patient does not have a scheduled HOSFU appointment noted.

## 2023-02-21 ENCOUNTER — PATIENT MESSAGE (OUTPATIENT)
Dept: ADMINISTRATIVE | Facility: CLINIC | Age: 23
End: 2023-02-21
Payer: MEDICAID

## 2023-05-24 ENCOUNTER — OFFICE VISIT (OUTPATIENT)
Dept: FAMILY MEDICINE | Facility: CLINIC | Age: 23
End: 2023-05-24
Payer: MEDICAID

## 2023-05-24 VITALS
SYSTOLIC BLOOD PRESSURE: 102 MMHG | RESPIRATION RATE: 16 BRPM | HEART RATE: 78 BPM | WEIGHT: 135.38 LBS | BODY MASS INDEX: 23.99 KG/M2 | OXYGEN SATURATION: 99 % | HEIGHT: 63 IN | DIASTOLIC BLOOD PRESSURE: 68 MMHG

## 2023-05-24 DIAGNOSIS — R10.9 ABDOMINAL PAIN, UNSPECIFIED ABDOMINAL LOCATION: Primary | ICD-10-CM

## 2023-05-24 DIAGNOSIS — Z00.00 WELLNESS EXAMINATION: ICD-10-CM

## 2023-05-24 PROCEDURE — 3078F DIAST BP <80 MM HG: CPT | Mod: CPTII,,, | Performed by: NURSE PRACTITIONER

## 2023-05-24 PROCEDURE — 1159F PR MEDICATION LIST DOCUMENTED IN MEDICAL RECORD: ICD-10-PCS | Mod: CPTII,,, | Performed by: NURSE PRACTITIONER

## 2023-05-24 PROCEDURE — 3074F SYST BP LT 130 MM HG: CPT | Mod: CPTII,,, | Performed by: NURSE PRACTITIONER

## 2023-05-24 PROCEDURE — 3078F PR MOST RECENT DIASTOLIC BLOOD PRESSURE < 80 MM HG: ICD-10-PCS | Mod: CPTII,,, | Performed by: NURSE PRACTITIONER

## 2023-05-24 PROCEDURE — 99213 PR OFFICE/OUTPT VISIT, EST, LEVL III, 20-29 MIN: ICD-10-PCS | Mod: ,,, | Performed by: NURSE PRACTITIONER

## 2023-05-24 PROCEDURE — 99213 OFFICE O/P EST LOW 20 MIN: CPT | Mod: ,,, | Performed by: NURSE PRACTITIONER

## 2023-05-24 PROCEDURE — 1159F MED LIST DOCD IN RCRD: CPT | Mod: CPTII,,, | Performed by: NURSE PRACTITIONER

## 2023-05-24 PROCEDURE — 3074F PR MOST RECENT SYSTOLIC BLOOD PRESSURE < 130 MM HG: ICD-10-PCS | Mod: CPTII,,, | Performed by: NURSE PRACTITIONER

## 2023-05-24 PROCEDURE — 3008F BODY MASS INDEX DOCD: CPT | Mod: CPTII,,, | Performed by: NURSE PRACTITIONER

## 2023-05-24 PROCEDURE — 3008F PR BODY MASS INDEX (BMI) DOCUMENTED: ICD-10-PCS | Mod: CPTII,,, | Performed by: NURSE PRACTITIONER

## 2023-05-24 RX ORDER — HYDROCODONE BITARTRATE AND ACETAMINOPHEN 7.5; 325 MG/1; MG/1
1 TABLET ORAL 2 TIMES DAILY
COMMUNITY
Start: 2023-05-16

## 2023-05-24 RX ORDER — PANTOPRAZOLE SODIUM 40 MG/1
40 TABLET, DELAYED RELEASE ORAL DAILY
Qty: 30 TABLET | Refills: 1 | Status: SHIPPED | OUTPATIENT
Start: 2023-05-24 | End: 2024-05-23

## 2023-05-24 NOTE — PROGRESS NOTES
"  SUBJECTIVE:     History of Present Illness      Chief Complaint: Abdominal Pain    HPI:  Patient is a 23 y.o. year old female who presents to clinic with complaints of abdominal pain.    Patient states when she eats spicy and greasy foods she has a cramping pain in her stomach.   She is post partum  3 months vaginal delivery. She denies vomitting.    She says pain is associated with nausea       Review of Systems:    Review of Systems    12 point review of systems conducted, negative except as stated in the history of present illness. See HPI for details.     Previous History      Review of patient's allergies indicates:  No Known Allergies    Past Medical History:   Diagnosis Date    Bilateral ovarian cysts     Fibroids     Hypertension      Current Outpatient Medications   Medication Instructions    gabapentin (NEURONTIN) 300 mg, Oral, 2 times daily    HYDROcodone-acetaminophen (NORCO) 7.5-325 mg per tablet 1 tablet, Oral, 2 times daily    medroxyPROGESTERone (DEPO-PROVERA) 150 mg, Intramuscular, Every 3 months    ondansetron (ZOFRAN) 8 mg, Oral, 2 times daily    pantoprazole (PROTONIX) 40 mg, Oral, Daily     Past Surgical History:   Procedure Laterality Date    APPENDECTOMY      BUNIONECTOMY      hem      tonsilectomy       History reviewed. No pertinent family history.    Social History     Tobacco Use    Smoking status: Never    Smokeless tobacco: Never   Substance Use Topics    Alcohol use: Never    Drug use: Never        Health Maintenance      Health Maintenance   Topic Date Due    Chlamydia Screening  07/06/2023    Pap Smear  06/30/2027    TETANUS VACCINE  02/16/2033    Hepatitis C Screening  Completed    Lipid Panel  Completed    HPV Vaccines  Completed       OBJECTIVE:     Physical Exam      Vital Signs Reviewed   Visit Vitals  /68 (BP Location: Right arm)   Pulse 78   Resp 16   Ht 5' 3" (1.6 m)   Wt 61.4 kg (135 lb 6.4 oz)   SpO2 99%   Breastfeeding No   BMI 23.99 kg/m²       Physical " Exam    Physical Exam:  General: Alert, well nourished, no acute distress, non-toxic appearing.   Eyes: Anicteric sclera, without conjunctival injection, normal lids, no purulent drainage, EOMs grossly intact.   Ears: No tragal tenderness. Tympanic membranes intact, pearly grey, without effusion or erythema and with a positive light reflex.   Mouth: Posterior pharynx without erythema. No exudate, ulcerations, or lesion. No tonsillar swelling.   Neck: Supple, full ROM, no rigidity, no cervical adenopathy.   Cardio: Normal rate and rhythm    Resp: Respirations even and unlabored, clear to auscultation bilaterally.   Abd:   Normal bowel sounds in all 4 quadrants. RUQ tenderness to palpation. No rebound tenderness or guarding. No CVA tenderness.   Skin: No rashes or open lesions noted.   MSK: No swelling. No abrasions or signs of trauma. Ambulating without assistance.   Neuro: Alert,oriented No focal deficits noted. Facial expressions even.   Psych: Cooperative, Normal affect      Procedures    Procedures     Labs     Results for orders placed or performed in visit on 03/22/23   POCT urine pregnancy   Result Value Ref Range    POC Preg Test, Ur Negative Negative     Acceptable Yes        Chemistry:  Lab Results   Component Value Date     01/18/2023    K 4.1 01/18/2023    CHLORIDE 105 01/18/2023    BUN 6.4 (L) 01/18/2023    CREATININE 0.52 (L) 01/18/2023    EGFRNORACEVR >60 01/18/2023    GLUCOSE 76 01/18/2023    CALCIUM 9.0 01/18/2023    ALKPHOS 87 01/18/2023    LABPROT 6.8 01/18/2023    ALBUMIN 3.1 (L) 01/18/2023    BILIDIR 0.4 08/10/2021    IBILI 0.60 08/10/2021    AST 14 01/18/2023    ALT 8 01/18/2023    BRCLBJKV11PK 20.3 (L) 08/10/2021    TSH 1.7730 06/30/2022    RARWZX1TENU 1.07 06/30/2022        Lab Results   Component Value Date    HGBA1C 5.6 11/11/2017        Hematology:  Lab Results   Component Value Date    WBC 15.1 (H) 02/16/2023    HGB 10.1 (L) 02/16/2023    HCT 32.6 (L) 02/16/2023    PLT  239 02/16/2023       Lipid Panel:  Lab Results   Component Value Date    CHOL 155 08/10/2021    HDL 59 08/10/2021    LDL 87.00 08/10/2021    TRIG 45 08/10/2021    TOTALCHOLEST 3 08/10/2021        Urine:  Lab Results   Component Value Date    COLORUA Yellow 10/04/2022    APPEARANCEUA Cloudy (A) 10/04/2022    SGUA 1.020 10/04/2022    PHUA 7.0 10/04/2022    PROTEINUA Negative 10/04/2022    GLUCOSEUA Negative 10/04/2022    KETONESUA Negative 10/04/2022    BLOODUA Negative 10/04/2022    NITRITESUA Negative 10/04/2022    LEUKOCYTESUR 1+ (A) 10/04/2022    RBCUA <5 10/04/2022    WBCUA 6 (H) 10/04/2022    BACTERIA 1+ (A) 10/04/2022    CREATRANDUR 59.9 11/21/2022    PROTEINURINE <6.8 11/21/2022         Assessment            ICD-10-CM ICD-9-CM   1. Abdominal pain, unspecified abdominal location  R10.9 789.00   2. Wellness examination  Z00.00 V70.0       Plan       1. Abdominal pain, unspecified abdominal location  - pantoprazole (PROTONIX) 40 MG tablet; Take 1 tablet (40 mg total) by mouth once daily.  Dispense: 30 tablet; Refill: 1  - US Abdomen Complete; Future  - ondansetron (ZOFRAN) 4 MG tablet; Take 2 tablets (8 mg total) by mouth 2 (two) times daily.  Dispense: 12 tablet; Refill: 0    2. Wellness examination  - CBC Auto Differential; Future  - Comprehensive Metabolic Panel; Future  - Lipid Panel; Future  - TSH; Future  - Hemoglobin A1C; Future  - Urinalysis; Future  - Vitamin D; Future  - Urinalysis    Orders Placed This Encounter    US Abdomen Complete    CBC Auto Differential    Comprehensive Metabolic Panel    Lipid Panel    TSH    Hemoglobin A1C    Urinalysis    Vitamin D    pantoprazole (PROTONIX) 40 MG tablet    ondansetron (ZOFRAN) 4 MG tablet      Medication List with Changes/Refills   New Medications    ONDANSETRON (ZOFRAN) 4 MG TABLET    Take 2 tablets (8 mg total) by mouth 2 (two) times daily.    PANTOPRAZOLE (PROTONIX) 40 MG TABLET    Take 1 tablet (40 mg total) by mouth once daily.   Current Medications     GABAPENTIN (NEURONTIN) 300 MG CAPSULE    Take 300 mg by mouth 2 (two) times daily.    HYDROCODONE-ACETAMINOPHEN (NORCO) 7.5-325 MG PER TABLET    Take 1 tablet by mouth 2 (two) times daily.    MEDROXYPROGESTERONE (DEPO-PROVERA) 150 MG/ML SYRG    Inject 1 mL (150 mg total) into the muscle every 3 (three) months.   Discontinued Medications    AMITRIPTYLINE (ELAVIL) 25 MG TABLET    TAKE 1 TABLET BY MOUTH ONCE DAILY AT NIGHT AT BEDTIME    CETIRIZINE (ZYRTEC) 10 MG TABLET    Take 10 mg by mouth.    FERROUS SULFATE 325 (65 FE) MG EC TABLET    Take 1 tablet (325 mg total) by mouth once daily.    FLUTICASONE PROPIONATE (FLONASE) 50 MCG/ACTUATION NASAL SPRAY    by Nasal route.    LACTULOSE (CHRONULAC) 10 GRAM/15 ML SOLUTION    Take 10 g by mouth.    LISINOPRIL (PRINIVIL,ZESTRIL) 20 MG TABLET    Take 20 mg by mouth.    OMEPRAZOLE (PRILOSEC) 20 MG CAPSULE    Take 1 capsule (20 mg total) by mouth once daily.    OXYCODONE-ACETAMINOPHEN (PERCOCET) 5-325 MG PER TABLET    Take 1 tablet by mouth every 6 (six) hours as needed for Pain.    PANTOPRAZOLE (PROTONIX) 40 MG TABLET    Take 40 mg by mouth.    PRENATAL VIT,CALC78-IRON-FOLIC 29-1 MG TAB    Take 1 tablet by mouth daily    PRENATE CHEWABLE 1 MG CHEW    Take 1 tablet by mouth once daily.       Follow up in about 4 weeks (around 6/21/2023) for Wellness.   Follow up in about 4 weeks (around 6/21/2023) for Wellness. In addition to their scheduled follow up, the patient has also been instructed to follow up on as needed basis.   Future Appointments   Date Time Provider Department Center   6/16/2023  9:00 AM Nikolas Boudreaux MD [facility]   6/21/2023  2:00 PM KATHI Peck [facility]

## 2023-05-25 RX ORDER — ONDANSETRON 4 MG/1
8 TABLET, FILM COATED ORAL 2 TIMES DAILY
Qty: 12 TABLET | Refills: 0 | Status: SHIPPED | OUTPATIENT
Start: 2023-05-25

## 2023-10-19 ENCOUNTER — HOSPITAL ENCOUNTER (EMERGENCY)
Facility: HOSPITAL | Age: 23
Discharge: HOME | End: 2023-10-19
Attending: EMERGENCY MEDICINE
Payer: MEDICAID

## 2023-10-19 VITALS
HEIGHT: 64 IN | RESPIRATION RATE: 18 BRPM | SYSTOLIC BLOOD PRESSURE: 111 MMHG | DIASTOLIC BLOOD PRESSURE: 70 MMHG | TEMPERATURE: 99 F | HEART RATE: 98 BPM | WEIGHT: 138 LBS | BODY MASS INDEX: 23.56 KG/M2 | OXYGEN SATURATION: 99 %

## 2023-10-19 DIAGNOSIS — K59.00 CONSTIPATION, UNSPECIFIED CONSTIPATION TYPE: Primary | ICD-10-CM

## 2023-10-19 DIAGNOSIS — R52 PAIN: ICD-10-CM

## 2023-10-19 LAB
APPEARANCE UR: CLEAR
B-HCG SERPL QL: NEGATIVE
BACTERIA #/AREA URNS AUTO: ABNORMAL /HPF
BILIRUB UR QL STRIP.AUTO: ABNORMAL
COLOR UR AUTO: YELLOW
GLUCOSE UR QL STRIP.AUTO: NEGATIVE
KETONES UR QL STRIP.AUTO: 40
LEUKOCYTE ESTERASE UR QL STRIP.AUTO: ABNORMAL
MUCOUS THREADS URNS QL MICRO: ABNORMAL /LPF
NITRITE UR QL STRIP.AUTO: NEGATIVE
PH UR STRIP.AUTO: 6 [PH]
PROT UR QL STRIP.AUTO: 30
RBC #/AREA URNS AUTO: ABNORMAL /HPF
RBC UR QL AUTO: NEGATIVE
SP GR UR STRIP.AUTO: >=1.03 (ref 1–1.03)
SQUAMOUS #/AREA URNS AUTO: ABNORMAL /HPF
UROBILINOGEN UR STRIP-ACNC: 0.2
WBC #/AREA URNS AUTO: ABNORMAL /HPF

## 2023-10-19 PROCEDURE — 81001 URINALYSIS AUTO W/SCOPE: CPT

## 2023-10-19 PROCEDURE — 25000003 PHARM REV CODE 250

## 2023-10-19 PROCEDURE — 99284 EMERGENCY DEPT VISIT MOD MDM: CPT

## 2023-10-19 PROCEDURE — 81025 URINE PREGNANCY TEST: CPT

## 2023-10-19 PROCEDURE — 96372 THER/PROPH/DIAG INJ SC/IM: CPT

## 2023-10-19 PROCEDURE — 63600175 PHARM REV CODE 636 W HCPCS

## 2023-10-19 RX ORDER — DICYCLOMINE HYDROCHLORIDE 20 MG/1
20 TABLET ORAL 2 TIMES DAILY
Qty: 10 TABLET | Refills: 0 | Status: SHIPPED | OUTPATIENT
Start: 2023-10-19 | End: 2023-11-18

## 2023-10-19 RX ORDER — KETOROLAC TROMETHAMINE 10 MG/1
10 TABLET, FILM COATED ORAL
Status: COMPLETED | OUTPATIENT
Start: 2023-10-19 | End: 2023-10-19

## 2023-10-19 RX ORDER — DICYCLOMINE HYDROCHLORIDE 10 MG/ML
20 INJECTION INTRAMUSCULAR
Status: COMPLETED | OUTPATIENT
Start: 2023-10-19 | End: 2023-10-19

## 2023-10-19 RX ORDER — ONDANSETRON 4 MG/1
4 TABLET, ORALLY DISINTEGRATING ORAL
Status: COMPLETED | OUTPATIENT
Start: 2023-10-19 | End: 2023-10-19

## 2023-10-19 RX ADMIN — ONDANSETRON 4 MG: 4 TABLET, ORALLY DISINTEGRATING ORAL at 04:10

## 2023-10-19 RX ADMIN — DICYCLOMINE HYDROCHLORIDE 20 MG: 20 INJECTION, SOLUTION INTRAMUSCULAR at 04:10

## 2023-10-19 RX ADMIN — KETOROLAC TROMETHAMINE 10 MG: 10 TABLET, FILM COATED ORAL at 04:10

## 2023-10-19 NOTE — ED PROVIDER NOTES
Encounter Date: 10/19/2023       History     Chief Complaint   Patient presents with    lower abdominal pain     Lower abdominal pain onset two hours ago lmp 10/10 states last normal bowel movement yesterday      Left lower quadrant pain x2 hours    The history is provided by the patient. No  was used.     Review of patient's allergies indicates:  No Known Allergies  Past Medical History:   Diagnosis Date    Bilateral ovarian cysts     Fibroids     Hypertension      Past Surgical History:   Procedure Laterality Date    APPENDECTOMY      BUNIONECTOMY      hem      tonsilectomy       No family history on file.  Social History     Tobacco Use    Smoking status: Never    Smokeless tobacco: Never   Substance Use Topics    Alcohol use: Never    Drug use: Never     Review of Systems   HENT: Negative.     Eyes: Negative.    Respiratory: Negative.     Cardiovascular: Negative.    Gastrointestinal:  Positive for abdominal pain and nausea.   Endocrine: Negative.    Genitourinary: Negative.    Musculoskeletal: Negative.    Skin: Negative.    Allergic/Immunologic: Negative.    Neurological: Negative.    Hematological: Negative.    Psychiatric/Behavioral: Negative.     All other systems reviewed and are negative.      Physical Exam     Initial Vitals [10/19/23 1615]   BP Pulse Resp Temp SpO2   111/70 98 18 98.8 °F (37.1 °C) 99 %      MAP       --         Physical Exam    Nursing note and vitals reviewed.  Constitutional: She appears well-developed and well-nourished.   HENT:   Head: Normocephalic and atraumatic.   Right Ear: External ear normal.   Left Ear: External ear normal.   Nose: Nose normal.   Mouth/Throat: Oropharynx is clear and moist.   Eyes: Conjunctivae and EOM are normal. Pupils are equal, round, and reactive to light.   Neck: Neck supple.   Normal range of motion.  Cardiovascular:  Normal rate, regular rhythm, normal heart sounds and intact distal pulses.           Pulmonary/Chest: Breath sounds  normal.   Abdominal: Abdomen is soft. Bowel sounds are normal. There is abdominal tenderness.   Musculoskeletal:         General: Normal range of motion.      Cervical back: Normal range of motion and neck supple.     Neurological: She is alert and oriented to person, place, and time. She has normal strength and normal reflexes. GCS score is 15. GCS eye subscore is 4. GCS verbal subscore is 5. GCS motor subscore is 6.   Skin: Skin is warm and dry. Capillary refill takes less than 2 seconds.   Psychiatric: She has a normal mood and affect. Her behavior is normal. Judgment and thought content normal.         ED Course   Procedures  Labs Reviewed   URINALYSIS, REFLEX TO URINE CULTURE - Abnormal; Notable for the following components:       Result Value    Protein, UA 30 (*)     Ketones, UA 40 (*)     Bilirubin, UA Small (*)     Leukocyte Esterase, UA Trace (*)     All other components within normal limits   URINALYSIS, MICROSCOPIC - Abnormal; Notable for the following components:    Bacteria, UA Few (*)     Mucous, UA Many (*)     Squamous Epithelial Cells, UA Many (*)     All other components within normal limits   PREGNANCY TEST, URINE RAPID - Normal          Imaging Results              X-Ray Abdomen Flat And Erect (Final result)  Result time 10/19/23 17:01:25      Final result by Enrique Del Rosario MD (10/19/23 17:01:25)                   Impression:      No acute radiographic findings.      Electronically signed by: Enrique Del Rosario  Date:    10/19/2023  Time:    17:01               Narrative:    EXAMINATION:  XR ABDOMEN FLAT AND ERECT    CLINICAL HISTORY:  Pain, unspecified    COMPARISON:  19 February 2020    FINDINGS:  Flat and upright views of the abdomen demonstrate a nonspecific, nonobstructive bowel gas pattern.  Moderate stool scattered in the colon.  There is no free air.                                       Medications   dicyclomine injection 20 mg (20 mg Intramuscular Given 10/19/23 6996)   ondansetron  disintegrating tablet 4 mg (4 mg Oral Given 10/19/23 1623)   ketorolac tablet 10 mg (10 mg Oral Given 10/19/23 1623)     Medical Decision Making  Awake alert and oriented no acute distress ambulatory gait steady 23-year-old female presents emergency room after onset of left lower quadrant cramping .  2 hours ago  patient denies nausea or vomiting.  Abdomen is soft tenderness to left lower quadrant radiating to the back.  Denies hematuria or dysuria. HEENT. head atraumatic.  Bilateral breath sounds clear to auscultation respirations even unlabored bowel sounds present to all quadrants.  Denies constipation last bowel movement yesterday was normal.  Skin warm dry and intact capillary refills less than 3.  GCS 15 cranial nerves 2-12 intact neuro focal intact.    Diff. Dx:  Constipation, UTI, Gerd    Amount and/or Complexity of Data Reviewed  Labs:      Details: U/A+ Protein, Ketones, Bilirubin, Leukocytes.  + Bacteria and Mucous  Radiology: ordered.     Details: Moderate stool but otherwise no abnormality.     Risk  Prescription drug management.                               Clinical Impression:   Final diagnoses:  [R52] Pain  [K59.00] Constipation, unspecified constipation type (Primary)        ED Disposition Condition    Discharge Stable          ED Prescriptions       Medication Sig Dispense Start Date End Date Auth. Provider    dicyclomine (BENTYL) 20 mg tablet Take 1 tablet (20 mg total) by mouth 2 (two) times daily. 10 tablet 10/19/2023 11/18/2023 Ramona Mercedes NP          Follow-up Information    None          Ramona Mercedes NP  10/19/23 8652

## 2023-10-19 NOTE — DISCHARGE INSTRUCTIONS
Patient will be discharged home.  Discussed with patient taking over-the-counter Mag citrate for bowel movement.  Follow up with the primary care provider if symptoms do not improve.  Limits your marijuana use.  Return ER for any worsening symptoms.

## 2024-02-12 DIAGNOSIS — Z00.00 WELLNESS EXAMINATION: Primary | ICD-10-CM

## 2024-04-08 ENCOUNTER — HOSPITAL ENCOUNTER (EMERGENCY)
Facility: HOSPITAL | Age: 24
Discharge: HOME | End: 2024-04-08
Attending: FAMILY MEDICINE
Payer: MEDICAID

## 2024-04-08 VITALS
HEART RATE: 90 BPM | RESPIRATION RATE: 18 BRPM | BODY MASS INDEX: 20.49 KG/M2 | OXYGEN SATURATION: 99 % | TEMPERATURE: 98 F | WEIGHT: 120 LBS | HEIGHT: 64 IN | DIASTOLIC BLOOD PRESSURE: 85 MMHG | SYSTOLIC BLOOD PRESSURE: 125 MMHG

## 2024-04-08 DIAGNOSIS — R51.9 FACIAL PAIN: Primary | ICD-10-CM

## 2024-04-08 PROCEDURE — 99284 EMERGENCY DEPT VISIT MOD MDM: CPT | Mod: 25

## 2024-04-08 RX ORDER — DICLOFENAC SODIUM 50 MG/1
50 TABLET, DELAYED RELEASE ORAL 3 TIMES DAILY PRN
Qty: 21 TABLET | Refills: 0 | Status: SHIPPED | OUTPATIENT
Start: 2024-04-08 | End: 2024-04-15
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
## 2024-05-14 DIAGNOSIS — Z00.00 WELLNESS EXAMINATION: Primary | ICD-10-CM

## 2024-06-01 ENCOUNTER — HOSPITAL ENCOUNTER (EMERGENCY)
Facility: HOSPITAL | Age: 24
Discharge: LEFT BEFORE DISCHARGE | End: 2024-06-01
Payer: MEDICAID

## 2024-06-01 VITALS
SYSTOLIC BLOOD PRESSURE: 125 MMHG | BODY MASS INDEX: 19.97 KG/M2 | HEIGHT: 64 IN | OXYGEN SATURATION: 98 % | TEMPERATURE: 98 F | HEART RATE: 93 BPM | DIASTOLIC BLOOD PRESSURE: 78 MMHG | WEIGHT: 117 LBS | RESPIRATION RATE: 18 BRPM

## 2024-06-01 PROCEDURE — 99900041 HC LEFT WITHOUT BEING SEEN- EMERGENCY

## 2024-07-12 ENCOUNTER — TELEPHONE (OUTPATIENT)
Dept: FAMILY MEDICINE | Facility: CLINIC | Age: 24
End: 2024-07-12
Payer: MEDICAID

## 2024-07-23 ENCOUNTER — LAB VISIT (OUTPATIENT)
Dept: LAB | Facility: HOSPITAL | Age: 24
End: 2024-07-23
Attending: OBSTETRICS & GYNECOLOGY
Payer: MEDICAID

## 2024-07-23 DIAGNOSIS — Z34.81 SUPERVISION OF NORMAL INTRAUTERINE PREGNANCY IN MULTIGRAVIDA, FIRST TRIMESTER: ICD-10-CM

## 2024-07-23 DIAGNOSIS — N91.2 AMENORRHEA: ICD-10-CM

## 2024-07-23 DIAGNOSIS — Z03.89 ENCNTR FOR OBS FOR OTH SUSPECTED DISEASES AND COND RULED OUT: ICD-10-CM

## 2024-07-23 LAB
BASOPHILS # BLD AUTO: 0.02 X10(3)/MCL
BASOPHILS NFR BLD AUTO: 0.7 %
EOSINOPHIL # BLD AUTO: 0.05 X10(3)/MCL (ref 0–0.9)
EOSINOPHIL NFR BLD AUTO: 1.8 %
ERYTHROCYTE [DISTWIDTH] IN BLOOD BY AUTOMATED COUNT: 15.8 % (ref 11.5–17)
GROUP & RH: NORMAL
HBV SURFACE AG SERPL QL IA: NONREACTIVE
HCT VFR BLD AUTO: 37.8 % (ref 37–47)
HCV AB SERPL QL IA: NONREACTIVE
HGB BLD-MCNC: 12 G/DL (ref 12–16)
HIV 1+2 AB+HIV1 P24 AG SERPL QL IA: NONREACTIVE
IMM GRANULOCYTES # BLD AUTO: 0 X10(3)/MCL (ref 0–0.04)
IMM GRANULOCYTES NFR BLD AUTO: 0 %
INDIRECT COOMBS: NORMAL
LYMPHOCYTES # BLD AUTO: 0.76 X10(3)/MCL (ref 0.6–4.6)
LYMPHOCYTES NFR BLD AUTO: 27.8 %
MCH RBC QN AUTO: 25.3 PG (ref 27–31)
MCHC RBC AUTO-ENTMCNC: 31.7 G/DL (ref 33–36)
MCV RBC AUTO: 79.6 FL (ref 80–94)
MONOCYTES # BLD AUTO: 0.32 X10(3)/MCL (ref 0.1–1.3)
MONOCYTES NFR BLD AUTO: 11.7 %
NEUTROPHILS # BLD AUTO: 1.58 X10(3)/MCL (ref 2.1–9.2)
NEUTROPHILS NFR BLD AUTO: 58 %
PLATELET # BLD AUTO: 282 X10(3)/MCL (ref 130–400)
PMV BLD AUTO: 10.7 FL (ref 7.4–10.4)
RBC # BLD AUTO: 4.75 X10(6)/MCL (ref 4.2–5.4)
SPECIMEN OUTDATE: NORMAL
T4 FREE SERPL-MCNC: 0.91 NG/DL (ref 0.7–1.48)
TSH SERPL-ACNC: 0.74 UIU/ML (ref 0.35–4.94)
WBC # BLD AUTO: 2.73 X10(3)/MCL (ref 4.5–11.5)

## 2024-07-23 PROCEDURE — 84443 ASSAY THYROID STIM HORMONE: CPT

## 2024-07-23 PROCEDURE — 86787 VARICELLA-ZOSTER ANTIBODY: CPT

## 2024-07-23 PROCEDURE — 86803 HEPATITIS C AB TEST: CPT

## 2024-07-23 PROCEDURE — 86850 RBC ANTIBODY SCREEN: CPT | Performed by: OBSTETRICS & GYNECOLOGY

## 2024-07-23 PROCEDURE — 87340 HEPATITIS B SURFACE AG IA: CPT

## 2024-07-23 PROCEDURE — 86780 TREPONEMA PALLIDUM: CPT

## 2024-07-23 PROCEDURE — 84439 ASSAY OF FREE THYROXINE: CPT

## 2024-07-23 PROCEDURE — 86762 RUBELLA ANTIBODY: CPT

## 2024-07-23 PROCEDURE — 86900 BLOOD TYPING SEROLOGIC ABO: CPT | Performed by: OBSTETRICS & GYNECOLOGY

## 2024-07-23 PROCEDURE — 87389 HIV-1 AG W/HIV-1&-2 AB AG IA: CPT

## 2024-07-23 PROCEDURE — 85025 COMPLETE CBC W/AUTO DIFF WBC: CPT

## 2024-07-23 PROCEDURE — 36415 COLL VENOUS BLD VENIPUNCTURE: CPT

## 2024-07-23 PROCEDURE — 83021 HEMOGLOBIN CHROMOTOGRAPHY: CPT

## 2024-07-24 LAB
RUBV IGG SERPL IA-ACNC: 1.7
RUBV IGG SERPL QL IA: POSITIVE
T PALLIDUM AB SER QL: NONREACTIVE
VZV IGG SER IA-ACNC: 1.7
VZV IGG SER QL IA: POSITIVE

## 2024-07-25 LAB
HGB A MFR BLD ELPH: 97.6 % (ref 95.8–98)
HGB A2 MFR BLD ELPH: 2.4 % (ref 2–3.3)
HGB F MFR BLD ELPH: 0 % (ref 0–0.9)
HGB FRACT BLD ELPH-IMP: NORMAL
M HPLC HB VARIANT, B: NORMAL

## 2024-11-19 ENCOUNTER — HOSPITAL ENCOUNTER (EMERGENCY)
Facility: HOSPITAL | Age: 24
Discharge: HOME | End: 2024-11-19
Attending: STUDENT IN AN ORGANIZED HEALTH CARE EDUCATION/TRAINING PROGRAM
Payer: MEDICAID

## 2024-11-19 VITALS
BODY MASS INDEX: 23.56 KG/M2 | SYSTOLIC BLOOD PRESSURE: 125 MMHG | OXYGEN SATURATION: 100 % | HEART RATE: 77 BPM | HEIGHT: 64 IN | WEIGHT: 138 LBS | RESPIRATION RATE: 18 BRPM | DIASTOLIC BLOOD PRESSURE: 84 MMHG | TEMPERATURE: 99 F

## 2024-11-19 DIAGNOSIS — M25.519 SHOULDER PAIN: Primary | ICD-10-CM

## 2024-11-19 DIAGNOSIS — M79.641 RIGHT HAND PAIN: ICD-10-CM

## 2024-11-19 PROCEDURE — 99283 EMERGENCY DEPT VISIT LOW MDM: CPT | Mod: 25

## 2024-11-19 RX ORDER — CYCLOBENZAPRINE HCL 10 MG
10 TABLET ORAL 3 TIMES DAILY PRN
Qty: 20 TABLET | Refills: 0 | Status: SHIPPED | OUTPATIENT
Start: 2024-11-19 | End: 2024-11-29

## 2024-11-19 NOTE — ED PROVIDER NOTES
Encounter Date: 11/19/2024      History     Chief Complaint   Patient presents with    Hand Pain     Reports last week she injured her right shoulder playing on a trampoline with the kids, that got better, but now has pain and swelling to her right hand     Eugene Lopez is a 24 y.o. female who presented to [facility] ED on 11/19/2024 with a primary complaint of right hand swelling and pain after patient experienced a injury on a trampoline where her nephew who is 6 ft 1 yanked on her right arm which caused a shooting/shocking sensation down from her right neck to her right hand.  She denies N/V/D, changes in vision, loss of consciousness, numbness and tingling of the right hand.    Review of patient's allergies indicates:  No Known Allergies  Past Medical History:   Diagnosis Date    Bilateral ovarian cysts     Fibroids     Hypertension      Past Surgical History:   Procedure Laterality Date    APPENDECTOMY      BUNIONECTOMY      hem      tonsilectomy       Family History   Problem Relation Name Age of Onset    Breast cancer Paternal Grandmother      Breast cancer Maternal Aunt       Social History     Tobacco Use    Smoking status: Never    Smokeless tobacco: Never   Substance Use Topics    Alcohol use: Never    Drug use: Never     ROS  Pertinent positives and negatives listed in HPI. All other systems are reviewed and are negative.    Physical Exam     Initial Vitals [11/19/24 1549]   BP Pulse Resp Temp SpO2   125/84 77 18 99.1 °F (37.3 °C) 100 %      MAP       --         General: Awake, alert, & oriented to person, place & time. No acute distress  HEENT: Normocephalic, atraumatic. Face symmetric.   Cardiovascular: Regular rate & rhythm.    Pulmonary: Bilateral symmetric chest rise, Non-labored.    Abdominal:  nondistended  Extremities: No clubbing or cyanosis.  Skin:  Exposed skin is warm & dry.  Neuro:   Patient moves all extremities equally. Sensation intact bilateraly.  MSK:  Neck:  Inspection- No skin changes,  swelling or erythema  Cervical spine ROM-WFL TTP-right trapezius with notable spasm  Spurlings negative  Lerhmites negative      ED Course   Procedures  Labs Reviewed - No data to display       Imaging Results    None          Medications - No data to display  Medical Decision Making  Eugene Lopez is a 24 y.o. female who presented to [facility] ED on 11/19/2024 with a primary complaint of right hand swelling and pain after patient experienced a injury on a trampoline where her nephew who is 6 ft 1 yanked on her right arm which caused a shooting/shocking sensation down from her right neck to her right hand.  She denies N/V/D, changes in vision, loss of consciousness, numbness and tingling of the right hand.    Physical exam is overall unremarkable pertinent negatives were negative Spurling sign.  Shoulder x-ray was unremarkable without signs of abnormality.    Discharge patient home with Flexeril.  Patient has upcoming appointment with PCP tomorrow, instructed her to speak with her primary to see if MRI is warranted at that time.    Problems Addressed:  Shoulder pain: self-limited or minor problem    Amount and/or Complexity of Data Reviewed  External Data Reviewed: labs and notes.  Radiology: ordered and independent interpretation performed. Decision-making details documented in ED Course.    Risk  Prescription drug management.  Risk Details: Ddx:  Peripheral neuropathy, electrolytes imbalances, cervical radiculopathy, malignancy, among others                   The patient is resting comfortably and is in no acute distress.  Patient states that her symptoms have improved after treatment within ER. Discussed test results, shared treatment plan, specific conditions for return, and importance of follow up with patient and family. Advised to complete her treatment with did as well. The patient understands and agrees with the plan as discussed. Answered all patient questions at this time.She has remained hemodynamically  stable throughout the ED course and is appropriate for discharge home.                              Clinical Impression:  Final diagnoses:  [M25.519] Shoulder pain                   Andrei Daniel MD  11/19/24 2477       Andrei Daniel MD  11/19/24 1928

## 2024-11-21 ENCOUNTER — OFFICE VISIT (OUTPATIENT)
Dept: FAMILY MEDICINE | Facility: CLINIC | Age: 24
End: 2024-11-21
Payer: MEDICAID

## 2024-11-21 VITALS
TEMPERATURE: 98 F | BODY MASS INDEX: 23.68 KG/M2 | SYSTOLIC BLOOD PRESSURE: 105 MMHG | WEIGHT: 138.69 LBS | HEIGHT: 64 IN | OXYGEN SATURATION: 100 % | DIASTOLIC BLOOD PRESSURE: 62 MMHG

## 2024-11-21 DIAGNOSIS — M25.511 ACUTE PAIN OF RIGHT SHOULDER: Primary | ICD-10-CM

## 2024-11-21 DIAGNOSIS — M79.672 FOOT PAIN, LEFT: ICD-10-CM

## 2024-11-21 PROCEDURE — 3078F DIAST BP <80 MM HG: CPT | Mod: CPTII,,, | Performed by: NURSE PRACTITIONER

## 2024-11-21 PROCEDURE — 99214 OFFICE O/P EST MOD 30 MIN: CPT | Mod: ,,, | Performed by: NURSE PRACTITIONER

## 2024-11-21 PROCEDURE — 3008F BODY MASS INDEX DOCD: CPT | Mod: CPTII,,, | Performed by: NURSE PRACTITIONER

## 2024-11-21 PROCEDURE — 3074F SYST BP LT 130 MM HG: CPT | Mod: CPTII,,, | Performed by: NURSE PRACTITIONER

## 2024-12-03 RX ORDER — GABAPENTIN 100 MG/1
300 CAPSULE ORAL 2 TIMES DAILY
Qty: 180 CAPSULE | Refills: 0 | Status: SHIPPED | OUTPATIENT
Start: 2024-12-03 | End: 2025-12-03

## 2024-12-03 NOTE — PROGRESS NOTES
SUBJECTIVE:     History of Present Illness      Chief Complaint: er f/u  (11/19/24 for shoulder and arm pain, given flexaril ( not helping ) said has nerve damage and she needs and mri . Right arm from shoulder down arm . ) and Referral (Bunion sx 2018 last time she saw dr was 2020 , he moved need referral due to condition is back and with pain ,  hemorrhoids referral inter and outer hemorrhoids. Flare up around cycle 11/10-11/14)    HPI:  Patient is a 24 y.o. year old female who presents to clinic  right shoulder and arm pain.   Patient also complains foot pain.  Does have a history of bunion surgery 2018.  She states right along her incision she feels pain and tenderness with bending her foot.    Review of Systems:    Review of Systems    12 point review of systems conducted, negative except as stated in the history of present illness. See HPI for details.     Previous History    Aundrea Ramirez, [facility]  Review of patient's allergies indicates:  No Known Allergies    Past Medical History:   Diagnosis Date    Bilateral ovarian cysts     Fibroids     Hx of hemorrhoids     Hypertension      Current Outpatient Medications   Medication Instructions    gabapentin (NEURONTIN) 300 mg, Oral, 2 times daily    promethazine (PHENERGAN) 12.5 mg, Oral, 2 times daily     Past Surgical History:   Procedure Laterality Date    APPENDECTOMY      BUNIONECTOMY      hem      tonsilectomy       Family History   Problem Relation Name Age of Onset    Breast cancer Paternal Grandmother      Breast cancer Maternal Aunt         Social History     Tobacco Use    Smoking status: Never    Smokeless tobacco: Never   Substance Use Topics    Alcohol use: Never    Drug use: Never        Health Maintenance      Health Maintenance   Topic Date Due    Chlamydia Screening  07/06/2023    Influenza Vaccine (1) 09/01/2024    COVID-19 Vaccine (5 - 2024-25 season) 09/01/2024    Pap Smear  06/30/2027    TETANUS VACCINE  02/16/2033    RSV Vaccine (Age 60+ and  "Pregnant patients) (1 - 1-dose 75+ series) 05/03/2076    Hepatitis C Screening  Completed    HIV Screening  Completed    Lipid Panel  Completed    HPV Vaccines  Completed    Pneumococcal Vaccines (Age 0-64)  Aged Out       OBJECTIVE:     Physical Exam      Vital Signs Reviewed   Visit Vitals  /62   Pulse (P) 73   Temp 97.7 °F (36.5 °C)   Ht 5' 4" (1.626 m)   Wt 62.9 kg (138 lb 11.2 oz)   LMP 11/10/2024 (Exact Date)   SpO2 100%   BMI 23.81 kg/m²       Physical Exam  Musculoskeletal:      Right shoulder: Tenderness present. Decreased range of motion.         Physical Exam:  General: Alert, well nourished, no acute distress, non-toxic appearing.   Eyes: Anicteric sclera, without conjunctival injection, normal lids, no purulent drainage, EOMs grossly intact.   Ears: No tragal tenderness. Tympanic membranes intact, pearly grey, without effusion or erythema and with a positive light reflex.   Mouth: Posterior pharynx without erythema. No exudate, ulcerations, or lesion. No tonsillar swelling.   Neck: Supple, full ROM, no rigidity, no cervical adenopathy.   Cardio: Normal rate and rhythm    Resp: Respirations even and unlabored, clear to auscultation bilaterally.   Abd: No ecchymosis or distension. Normal bowel sounds in all 4 quadrants. No tenderness to palpation. No rebound tenderness or guarding. No CVA tenderness.   Skin: No rashes or open lesions noted.     Neuro: Alert,oriented No focal deficits noted. Facial expressions even.   Psych: Cooperative, Normal affect      Procedures    Procedures     Labs     Results for orders placed or performed in visit on 08/27/24   POCT urine pregnancy    Collection Time: 08/27/24  1:46 PM   Result Value Ref Range    POC Preg Test, Ur Negative Negative     Acceptable Yes        Chemistry:  Lab Results   Component Value Date     01/18/2023    K 4.1 01/18/2023    BUN 6.4 (L) 01/18/2023    CREATININE 0.52 (L) 01/18/2023    EGFRNORACEVR >60 01/18/2023    " GLUCOSE 76 01/18/2023    CALCIUM 9.0 01/18/2023    ALKPHOS 87 01/18/2023    LABPROT 6.8 01/18/2023    ALBUMIN 3.1 (L) 01/18/2023    BILIDIR 0.4 08/10/2021    IBILI 0.60 08/10/2021    AST 14 01/18/2023    ALT 8 01/18/2023    UKZBENIX87EF 20.3 (L) 08/10/2021    TSH 0.740 07/23/2024    CWWASU5FVOM 0.91 07/23/2024        Lab Results   Component Value Date    HGBA1C 5.6 11/11/2017        Hematology:  Lab Results   Component Value Date    WBC 2.73 (L) 07/23/2024    HGB 12.0 07/23/2024    HCT 37.8 07/23/2024     07/23/2024       Lipid Panel:  Lab Results   Component Value Date    CHOL 155 08/10/2021    HDL 59 08/10/2021    LDL 87.00 08/10/2021    TRIG 45 08/10/2021    TOTALCHOLEST 3 08/10/2021        Urine:  Lab Results   Component Value Date    APPEARANCEUA Clear 10/19/2023    SGUA >=1.030 10/19/2023    PROTEINUA 30 (A) 10/19/2023    KETONESUA 40 (A) 10/19/2023    LEUKOCYTESUR Trace (A) 10/19/2023    RBCUA None Seen 10/19/2023    WBCUA 0-5 10/19/2023    BACTERIA Few (A) 10/19/2023    CREATRANDUR 59.9 11/21/2022    PROTEINURINE <6.8 11/21/2022         Assessment            ICD-10-CM ICD-9-CM   1. Acute pain of right shoulder  M25.511 719.41   2. Foot pain, left  M79.672 729.5       Plan       1. Acute pain of right shoulder  - gabapentin (NEURONTIN) 100 MG capsule; Take 3 capsules (300 mg total) by mouth 2 (two) times daily.  Dispense: 180 capsule; Refill: 0  - Ambulatory referral/consult to Physical/Occupational Therapy; Future    2. Foot pain, left  - X-Ray Foot Complete Left; Future  - Ambulatory referral/consult to Podiatry; Future    Orders Placed This Encounter    X-Ray Foot Complete Left    Ambulatory referral/consult to Physical/Occupational Therapy    Ambulatory referral/consult to Podiatry    gabapentin (NEURONTIN) 100 MG capsule      Medication List with Changes/Refills   New Medications    GABAPENTIN (NEURONTIN) 100 MG CAPSULE    Take 3 capsules (300 mg total) by mouth 2 (two) times daily.   Current  Medications    PROMETHAZINE (PHENERGAN) 12.5 MG TAB    Take 1 tablet (12.5 mg total) by mouth 2 (two) times a day.   Discontinued Medications    MEDROXYPROGESTERONE (PROVERA) 10 MG TABLET    Take 1 tablet (10 mg total) by mouth once daily.    PRENATAL VIT,CALC78-IRON-FOLIC 29-1 MG TAB    Take 1 tablet by mouth daily       Follow up in about 8 weeks (around 1/16/2025).   Follow up in about 8 weeks (around 1/16/2025). In addition to their scheduled follow up, the patient has also been instructed to follow up on as needed basis.   Future Appointments   Date Time Provider Department Center   1/16/2025  2:45 PM Aundrea Ramirez FNP [facility]   7/22/2025 10:00 AM Nikolas Boudreaux MD [facility]

## 2025-01-09 DIAGNOSIS — Z00.00 WELLNESS EXAMINATION: Primary | ICD-10-CM

## 2025-03-17 ENCOUNTER — HOSPITAL ENCOUNTER (EMERGENCY)
Facility: HOSPITAL | Age: 25
End: 2025-03-17
Attending: EMERGENCY MEDICINE
Payer: MEDICAID

## 2025-03-17 ENCOUNTER — HOSPITAL ENCOUNTER (INPATIENT)
Facility: HOSPITAL | Age: 25
LOS: 2 days | Discharge: HOME | DRG: 103 | End: 2025-03-19
Attending: EMERGENCY MEDICINE | Admitting: HOSPITALIST
Payer: MEDICAID

## 2025-03-17 VITALS
HEART RATE: 86 BPM | SYSTOLIC BLOOD PRESSURE: 130 MMHG | BODY MASS INDEX: 23.9 KG/M2 | TEMPERATURE: 98 F | DIASTOLIC BLOOD PRESSURE: 95 MMHG | WEIGHT: 140 LBS | HEIGHT: 64 IN | OXYGEN SATURATION: 100 % | RESPIRATION RATE: 17 BRPM

## 2025-03-17 DIAGNOSIS — R51.9 ACUTE INTRACTABLE HEADACHE, UNSPECIFIED HEADACHE TYPE: ICD-10-CM

## 2025-03-17 DIAGNOSIS — I63.9 STROKE: ICD-10-CM

## 2025-03-17 DIAGNOSIS — I63.9 CEREBROVASCULAR ACCIDENT (CVA), UNSPECIFIED MECHANISM: Primary | ICD-10-CM

## 2025-03-17 DIAGNOSIS — R53.1 WEAKNESS: ICD-10-CM

## 2025-03-17 DIAGNOSIS — R09.89 SUSPECTED CEREBROVASCULAR ACCIDENT (CVA): ICD-10-CM

## 2025-03-17 LAB
ACCEPTIBLE SP GR UR QL: 1.01 (ref 1–1.03)
ALBUMIN SERPL-MCNC: 4.1 G/DL (ref 3.5–5)
ALBUMIN/GLOB SERPL: 0.9 RATIO (ref 1.1–2)
ALP SERPL-CCNC: 52 UNIT/L (ref 40–150)
ALT SERPL-CCNC: 14 UNIT/L (ref 0–55)
AMPHET UR QL SCN: NEGATIVE
ANION GAP SERPL CALC-SCNC: 9 MEQ/L
AST SERPL-CCNC: 26 UNIT/L (ref 5–34)
B-HCG UR QL: NEGATIVE
BARBITURATE SCN PRESENT UR: NEGATIVE
BASOPHILS # BLD AUTO: 0.03 X10(3)/MCL
BASOPHILS NFR BLD AUTO: 0.8 %
BENZODIAZ UR QL SCN: NEGATIVE
BILIRUB SERPL-MCNC: 0.9 MG/DL
BILIRUB UR QL STRIP.AUTO: NEGATIVE
BUN SERPL-MCNC: 10.5 MG/DL (ref 7–18.7)
CALCIUM SERPL-MCNC: 9.1 MG/DL (ref 8.4–10.2)
CANNABINOIDS UR QL SCN: NEGATIVE
CHLORIDE SERPL-SCNC: 106 MMOL/L (ref 98–107)
CHOLEST SERPL-MCNC: 179 MG/DL
CHOLEST/HDLC SERPL: 2 {RATIO} (ref 0–5)
CLARITY UR: CLEAR
CO2 SERPL-SCNC: 22 MMOL/L (ref 22–29)
COCAINE UR QL SCN: NEGATIVE
COLOR UR AUTO: YELLOW
CREAT SERPL-MCNC: 0.7 MG/DL (ref 0.55–1.02)
CREAT/UREA NIT SERPL: 15
EOSINOPHIL # BLD AUTO: 0.3 X10(3)/MCL (ref 0–0.9)
EOSINOPHIL NFR BLD AUTO: 7.7 %
ERYTHROCYTE [DISTWIDTH] IN BLOOD BY AUTOMATED COUNT: 19.4 % (ref 11.5–17)
EST. AVERAGE GLUCOSE BLD GHB EST-MCNC: 125.5 MG/DL
ETHANOL SERPL-MCNC: <10 MG/DL
FENTANYL UR QL SCN: NEGATIVE
GFR SERPLBLD CREATININE-BSD FMLA CKD-EPI: >60 ML/MIN/1.73/M2
GLOBULIN SER-MCNC: 4.5 GM/DL (ref 2.4–3.5)
GLUCOSE SERPL-MCNC: 89 MG/DL (ref 74–100)
GLUCOSE UR QL STRIP: NEGATIVE
GROUP & RH: NORMAL
HBA1C MFR BLD: 6 %
HCT VFR BLD AUTO: 36.7 % (ref 37–47)
HDLC SERPL-MCNC: 85 MG/DL (ref 35–60)
HGB BLD-MCNC: 11.3 G/DL (ref 12–16)
HGB UR QL STRIP: NEGATIVE
IMM GRANULOCYTES # BLD AUTO: 0.01 X10(3)/MCL (ref 0–0.04)
IMM GRANULOCYTES NFR BLD AUTO: 0.3 %
INDIRECT COOMBS: NORMAL
INR PPP: 1
KETONES UR QL STRIP: NEGATIVE
LDLC SERPL CALC-MCNC: 86 MG/DL (ref 50–140)
LEUKOCYTE ESTERASE UR QL STRIP: NEGATIVE
LYMPHOCYTES # BLD AUTO: 1.48 X10(3)/MCL (ref 0.6–4.6)
LYMPHOCYTES NFR BLD AUTO: 37.9 %
MCH RBC QN AUTO: 23.4 PG (ref 27–31)
MCHC RBC AUTO-ENTMCNC: 30.8 G/DL (ref 33–36)
MCV RBC AUTO: 76.1 FL (ref 80–94)
MONOCYTES # BLD AUTO: 0.37 X10(3)/MCL (ref 0.1–1.3)
MONOCYTES NFR BLD AUTO: 9.5 %
NEUTROPHILS # BLD AUTO: 1.72 X10(3)/MCL (ref 2.1–9.2)
NEUTROPHILS NFR BLD AUTO: 43.8 %
NITRITE UR QL STRIP: NEGATIVE
OHS QRS DURATION: 82 MS
OHS QTC CALCULATION: 427 MS
OPIATES UR QL SCN: NEGATIVE
PCP UR QL: NEGATIVE
PH UR STRIP: 5.5 [PH]
PH UR: 5.5 [PH] (ref 3–11)
PLATELET # BLD AUTO: 334 X10(3)/MCL (ref 130–400)
PMV BLD AUTO: 10.6 FL (ref 7.4–10.4)
POCT GLUCOSE: 88 MG/DL (ref 70–110)
POTASSIUM SERPL-SCNC: 4.7 MMOL/L (ref 3.5–5.1)
PROT SERPL-MCNC: 8.6 GM/DL (ref 6.4–8.3)
PROT UR QL STRIP: NEGATIVE
PROTHROMBIN TIME: <10 SECONDS (ref 12.5–14.5)
RBC # BLD AUTO: 4.82 X10(6)/MCL (ref 4.2–5.4)
SODIUM SERPL-SCNC: 137 MMOL/L (ref 136–145)
SP GR UR STRIP.AUTO: 1.01 (ref 1–1.03)
SPECIMEN OUTDATE: NORMAL
TRIGL SERPL-MCNC: 41 MG/DL (ref 37–140)
TSH SERPL-ACNC: 1.76 UIU/ML (ref 0.35–4.94)
UROBILINOGEN UR STRIP-ACNC: 0.2
VLDLC SERPL CALC-MCNC: 8 MG/DL
WBC # BLD AUTO: 3.91 X10(3)/MCL (ref 4.5–11.5)

## 2025-03-17 PROCEDURE — 99285 EMERGENCY DEPT VISIT HI MDM: CPT | Mod: 25

## 2025-03-17 PROCEDURE — 25000003 PHARM REV CODE 250

## 2025-03-17 PROCEDURE — 99223 1ST HOSP IP/OBS HIGH 75: CPT | Mod: FS,,, | Performed by: PSYCHIATRY & NEUROLOGY

## 2025-03-17 PROCEDURE — 93005 ELECTROCARDIOGRAM TRACING: CPT

## 2025-03-17 PROCEDURE — 99291 CRITICAL CARE FIRST HOUR: CPT

## 2025-03-17 PROCEDURE — 96374 THER/PROPH/DIAG INJ IV PUSH: CPT

## 2025-03-17 PROCEDURE — 85025 COMPLETE CBC W/AUTO DIFF WBC: CPT | Performed by: EMERGENCY MEDICINE

## 2025-03-17 PROCEDURE — 83036 HEMOGLOBIN GLYCOSYLATED A1C: CPT

## 2025-03-17 PROCEDURE — 20000000 HC ICU ROOM

## 2025-03-17 PROCEDURE — 85610 PROTHROMBIN TIME: CPT | Performed by: EMERGENCY MEDICINE

## 2025-03-17 PROCEDURE — 81003 URINALYSIS AUTO W/O SCOPE: CPT | Performed by: EMERGENCY MEDICINE

## 2025-03-17 PROCEDURE — 80307 DRUG TEST PRSMV CHEM ANLYZR: CPT | Performed by: EMERGENCY MEDICINE

## 2025-03-17 PROCEDURE — 81025 URINE PREGNANCY TEST: CPT | Performed by: EMERGENCY MEDICINE

## 2025-03-17 PROCEDURE — 80053 COMPREHEN METABOLIC PANEL: CPT | Performed by: EMERGENCY MEDICINE

## 2025-03-17 PROCEDURE — 25000003 PHARM REV CODE 250: Performed by: PSYCHIATRY & NEUROLOGY

## 2025-03-17 PROCEDURE — 86900 BLOOD TYPING SEROLOGIC ABO: CPT

## 2025-03-17 PROCEDURE — 82077 ASSAY SPEC XCP UR&BREATH IA: CPT | Performed by: EMERGENCY MEDICINE

## 2025-03-17 PROCEDURE — 25500020 PHARM REV CODE 255: Performed by: EMERGENCY MEDICINE

## 2025-03-17 PROCEDURE — A9577 INJ MULTIHANCE: HCPCS | Performed by: PSYCHIATRY & NEUROLOGY

## 2025-03-17 PROCEDURE — 63600175 PHARM REV CODE 636 W HCPCS: Mod: JW,TB | Performed by: EMERGENCY MEDICINE

## 2025-03-17 PROCEDURE — 36415 COLL VENOUS BLD VENIPUNCTURE: CPT

## 2025-03-17 PROCEDURE — 25000003 PHARM REV CODE 250: Performed by: EMERGENCY MEDICINE

## 2025-03-17 PROCEDURE — 93010 ELECTROCARDIOGRAM REPORT: CPT | Mod: ,,, | Performed by: INTERNAL MEDICINE

## 2025-03-17 PROCEDURE — 25500020 PHARM REV CODE 255: Performed by: PSYCHIATRY & NEUROLOGY

## 2025-03-17 PROCEDURE — 80061 LIPID PANEL: CPT

## 2025-03-17 PROCEDURE — 84443 ASSAY THYROID STIM HORMONE: CPT

## 2025-03-17 PROCEDURE — A4216 STERILE WATER/SALINE, 10 ML: HCPCS | Performed by: EMERGENCY MEDICINE

## 2025-03-17 PROCEDURE — 63600175 PHARM REV CODE 636 W HCPCS

## 2025-03-17 PROCEDURE — 63600175 PHARM REV CODE 636 W HCPCS: Performed by: EMERGENCY MEDICINE

## 2025-03-17 RX ORDER — SODIUM CHLORIDE 0.9 % (FLUSH) 0.9 %
10 SYRINGE (ML) INJECTION
Status: DISCONTINUED | OUTPATIENT
Start: 2025-03-17 | End: 2025-03-19 | Stop reason: HOSPADM

## 2025-03-17 RX ORDER — MAGNESIUM SULFATE HEPTAHYDRATE 40 MG/ML
2 INJECTION, SOLUTION INTRAVENOUS ONCE
Status: COMPLETED | OUTPATIENT
Start: 2025-03-17 | End: 2025-03-17

## 2025-03-17 RX ORDER — ATORVASTATIN CALCIUM 40 MG/1
40 TABLET, FILM COATED ORAL DAILY
Status: DISCONTINUED | OUTPATIENT
Start: 2025-03-18 | End: 2025-03-19 | Stop reason: HOSPADM

## 2025-03-17 RX ORDER — MUPIROCIN 20 MG/G
OINTMENT TOPICAL 2 TIMES DAILY
Status: DISCONTINUED | OUTPATIENT
Start: 2025-03-17 | End: 2025-03-19

## 2025-03-17 RX ORDER — SODIUM CHLORIDE 0.9 % (FLUSH) 0.9 %
10 SYRINGE (ML) INJECTION ONCE
Status: COMPLETED | OUTPATIENT
Start: 2025-03-17 | End: 2025-03-17

## 2025-03-17 RX ORDER — BUTALBITAL, ACETAMINOPHEN AND CAFFEINE 50; 325; 40 MG/1; MG/1; MG/1
1 TABLET ORAL EVERY 4 HOURS PRN
Status: DISCONTINUED | OUTPATIENT
Start: 2025-03-17 | End: 2025-03-17

## 2025-03-17 RX ORDER — BISACODYL 10 MG/1
10 SUPPOSITORY RECTAL DAILY PRN
Status: DISCONTINUED | OUTPATIENT
Start: 2025-03-17 | End: 2025-03-19 | Stop reason: HOSPADM

## 2025-03-17 RX ORDER — PROCHLORPERAZINE EDISYLATE 5 MG/ML
10 INJECTION INTRAMUSCULAR; INTRAVENOUS
Status: COMPLETED | OUTPATIENT
Start: 2025-03-17 | End: 2025-03-17

## 2025-03-17 RX ORDER — DIPHENHYDRAMINE HYDROCHLORIDE 50 MG/ML
25 INJECTION, SOLUTION INTRAMUSCULAR; INTRAVENOUS
Status: COMPLETED | OUTPATIENT
Start: 2025-03-17 | End: 2025-03-17

## 2025-03-17 RX ORDER — ACETAMINOPHEN 325 MG/1
650 TABLET ORAL EVERY 6 HOURS PRN
Status: DISCONTINUED | OUTPATIENT
Start: 2025-03-17 | End: 2025-03-19 | Stop reason: HOSPADM

## 2025-03-17 RX ORDER — LORAZEPAM 1 MG/1
2 TABLET ORAL ONCE
Status: COMPLETED | OUTPATIENT
Start: 2025-03-17 | End: 2025-03-17

## 2025-03-17 RX ADMIN — LORAZEPAM 2 MG: 1 TABLET ORAL at 07:03

## 2025-03-17 RX ADMIN — DIPHENHYDRAMINE HYDROCHLORIDE 25 MG: 50 INJECTION INTRAMUSCULAR; INTRAVENOUS at 03:03

## 2025-03-17 RX ADMIN — Medication 16 MG: at 02:03

## 2025-03-17 RX ADMIN — GADOBENATE DIMEGLUMINE 12 ML: 529 INJECTION, SOLUTION INTRAVENOUS at 09:03

## 2025-03-17 RX ADMIN — MAGNESIUM SULFATE HEPTAHYDRATE 2 G: 40 INJECTION, SOLUTION INTRAVENOUS at 06:03

## 2025-03-17 RX ADMIN — PROCHLORPERAZINE EDISYLATE 10 MG: 5 INJECTION INTRAMUSCULAR; INTRAVENOUS at 03:03

## 2025-03-17 RX ADMIN — SODIUM CHLORIDE 500 ML: 0.9 INJECTION, SOLUTION INTRAVENOUS at 03:03

## 2025-03-17 RX ADMIN — SODIUM CHLORIDE, PRESERVATIVE FREE 10 ML: 5 INJECTION INTRAVENOUS at 02:03

## 2025-03-17 RX ADMIN — ACETAMINOPHEN 650 MG: 325 TABLET, FILM COATED ORAL at 06:03

## 2025-03-17 RX ADMIN — IOHEXOL 100 ML: 350 INJECTION, SOLUTION INTRAVENOUS at 02:03

## 2025-03-17 NOTE — ED PROVIDER NOTES
Encounter Date: 3/17/2025       History     Chief Complaint   Patient presents with    Transfer     Transfer from [facility]. Given TNK . GCS 15      Previously healthy 24-year-old female presents to the emergency department for evaluation following TNK administration at [facility] for suspected CVA.  Reports she woke up around 10 30 this morning and felt some pulling in her right side of her face; however, back to sleep.  Woke up approximately 45 minutes later and was unable to move her right arm or right leg.  Having trouble communicating with her mother, could not find the words she wanted to use.  Arrived at [facility], noncontrast CT scan demonstrated no acute abnormalities.  Given TNK at 2:08 p.m..  Still with significant weakness of the right upper and right lower extremity.  Reports history of hypotension when she was younger however not currently on medications.  Denies any estrogen or other hormone use.  Denies smoking.  Reports occasional alcohol use.      Complaining of headache and lightheadedness. No     The history is provided by the patient, medical records and the EMS personnel.     Review of patient's allergies indicates:  No Known Allergies  Past Medical History:   Diagnosis Date    Bilateral ovarian cysts     Fibroids     Hx of hemorrhoids     Hypertension      Past Surgical History:   Procedure Laterality Date    APPENDECTOMY      BUNIONECTOMY      hem      tonsilectomy       Family History   Problem Relation Name Age of Onset    Breast cancer Paternal Grandmother      Breast cancer Maternal Aunt       Social History[1]  Review of Systems   Constitutional:  Negative for fever.   HENT:  Negative for sore throat.    Respiratory:  Negative for shortness of breath.    Cardiovascular:  Negative for chest pain.   Gastrointestinal:  Negative for nausea.   Genitourinary:  Negative for dysuria.   Musculoskeletal:  Negative for back pain.   Skin:  Negative for rash.   Neurological:   Positive for dizziness, speech difficulty, weakness and headaches.   Hematological:  Does not bruise/bleed easily.       Physical Exam     Initial Vitals [03/17/25 1527]   BP Pulse Resp Temp SpO2   125/84 72 16 98.4 °F (36.9 °C) 100 %      MAP       --         Physical Exam    Nursing note and vitals reviewed.  Constitutional: She appears well-developed and well-nourished. No distress.   HENT:   Head: Normocephalic and atraumatic. Mouth/Throat: Oropharynx is clear and moist.   Eyes: Conjunctivae and EOM are normal. Pupils are equal, round, and reactive to light.   Neck: Neck supple.   Normal range of motion.  Cardiovascular:  Normal rate and regular rhythm.           Pulmonary/Chest: Breath sounds normal. No respiratory distress.   Abdominal: Abdomen is soft. She exhibits no distension. There is no abdominal tenderness.   Musculoskeletal:         General: Normal range of motion.      Cervical back: Normal range of motion and neck supple.     Neurological: She is alert and oriented to person, place, and time. GCS score is 15. GCS eye subscore is 4. GCS verbal subscore is 5. GCS motor subscore is 6.   No facial asymmetry, tongue protrudes midline, symmetric shoulder shrug, sensation intact to light touch throughout face.    Right upper extremity drift which does hit bed before 10 seconds, 4/5  strength  Unable to right lower extremity off bed.  Able to plantar and dorsiflex however 3/5 strength noted.  Sensation intact to light touch in bilateral upper and lower extremities   Skin: Skin is warm and dry. No rash noted.   Psychiatric: She has a normal mood and affect.         ED Course   Critical Care    Date/Time: 3/17/2025 3:26 PM    Performed by: Michelle Spencer MD  Authorized by: Michelle Spencer MD  Direct patient critical care time: 19 minutes  Additional history critical care time: 6 minutes  Documentation critical care time: 4 minutes  Consulting other physicians critical care time: 4 minutes  Total  critical care time (exclusive of procedural time) : 33 minutes  Critical care time was exclusive of separately billable procedures and treating other patients.  Critical care was necessary to treat or prevent imminent or life-threatening deterioration of the following conditions: CNS failure or compromise.  Critical care was time spent personally by me on the following activities: development of treatment plan with patient or surrogate, discussions with consultants, interpretation of cardiac output measurements, examination of patient, obtaining history from patient or surrogate and review of old charts.  Subsequent provider of critical care: I assumed direction of critical care for this patient from another provider of my specialty.        Labs Reviewed - No data to display       Imaging Results              CT Head Without Contrast (Final result)  Result time 03/17/25 16:28:18      Final result by Andrei Lennon MD (03/17/25 16:28:18)                   Narrative:    EXAMINATION  CT HEAD WITHOUT CONTRAST    CLINICAL HISTORY  Headache, new or worsening, neuro deficit (Age 19-49y);Patient reports increasing/severe headache post TNK administration at prior facility;    TECHNIQUE  Axial non-contrast CT images of the head were acquired and multiplanar reconstructions accomplished by a CT technologist at a separate workstation, pushed to PACS for physician review.    COMPARISON  17 March 2025    FINDINGS  Images were reviewed in subdural, brain, soft tissue, and bone windows.    Exam quality: Motion/streak artifact limits assessment of the posterior fossa.    No development of hyperdensity to suggest acute/worsened intracranial hemorrhage. Gray-white differentiation is maintained.  No new or worsening focal mass effect or midline shift.  There are no parenchymal attenuation changes to suggest evolving ischemic infarct.  Similar appearance of the ventricular system and sulcal spaces.  No expansile extra-axial collection  is identified.  Chronic/age-related intracranial changes are stable in the short interval.    Visualized paranasal sinuses and mastoid cavities are clear.  No new or worsening focal abnormality of the regional osseous structures and extracranial soft tissues.  Vascular components are unchanged by limited non-contrast CT assessment.    IMPRESSION  1. No convincing acute intracranial abnormality.  2. Additional secondary details discussed above, relatively unchanged from the comparison exam.    RADIATION DOSE  Automated tube current modulation, weight-based exposure dosing, and/or iterative reconstruction technique utilized to reach lowest reasonably achievable exposure rate.    DLP: 1906 mGy*cm      Electronically signed by: Andrei Lennon  Date:    03/17/2025  Time:    16:28                                     Medications   prochlorperazine injection Soln 10 mg (has no administration in time range)   diphenhydrAMINE injection 25 mg (has no administration in time range)   sodium chloride 0.9% bolus 500 mL 500 mL (has no administration in time range)     Medical Decision Making  Problems Addressed:  Suspected cerebrovascular accident (CVA): acute illness or injury    Amount and/or Complexity of Data Reviewed  Radiology: ordered.    Risk  Prescription drug management.  Decision regarding hospitalization.      ED assessment:    Ms. Lopez Transferred here for higher level of care, neurology evaluation after diagnosed with suspected CVA, received TNK proximally 1 hour prior to arrival.  Still with right arm, right leg weakness.  Noncontrast and CTA of the head and neck demonstrated no acute findings at previous facility.  Laboratory studies similarly benign.    Differential diagnosis (including but not limited to):   Ischemic CVA, cerebral vasospasm, complex migraine, focal seizure, acute kidney injury, electrolyte derangements, somatization    ED management:   I reviewed the workup from the previous facility with  findings as below.  Patient is still complaining of headache - states increasing throughout transport post TNK administration.  Repeat head CT ordered which demonstrates no acute findings or interval change.  Medications ordered to help with discomfort.  Case discussed with ICU who accepts for admit.  Consultation placed to stroke Neurology as well.     Amount and/or Complexity of Data Reviewed  Independent historian: EMS   Summary of history:  Transfer from [facility] post TNK administration for suspected CVA with right-sided deficits upon waking around 11:00 a.m..  External data reviewed: transfer records, prior labs, and prior imaging  Summary of data reviewed:     CT head obtained at 1:12 p.m.: No acute intracranial findings.    CT angiogram head and neck obtained at 2:41 p.m.: No hemodynamically significant flow-limiting stenosis identified.    TNK administered at 2:08 p.m.   WBC with leukopenia, mild microcytic anemia, no significant electrolyte derangements or renal dysfunction.  PT INR, alcohol and urine drug screen all within normal limits.    Risk and benefits of testing: discussed     ECG/medicine tests: ordered and independent interpretation performed (see above or ED course)  Discussion of management or test interpretation with external provider(s): discussed with critical care Medicine, stroke Neurology consultant   Summary of discussion:  As above    Risk  Decision regarding hospitalization  Shared decision making     Critical Care  30-74 minutes     IMichelle MD personally performed the history, PE, MDM, and procedures as documented above and agree with the scribe's documentation.                                     Clinical Impression:  Final diagnoses:  [R09.89] Suspected cerebrovascular accident (CVA)  [R51.9] Acute intractable headache, unspecified headache type          ED Disposition Condition    Admit Stable                    [1]   Social History  Tobacco Use    Smoking status: Never     Smokeless tobacco: Never   Substance Use Topics    Alcohol use: Never    Drug use: Never        Michelle Spencer MD  03/17/25 0279

## 2025-03-17 NOTE — ED NOTES
ACCEPTED BY DR FREY AT [facility]   Positioning (Leave Blank If You Do Not Want): The patient was placed in a comfortable position exposing the surgical site.

## 2025-03-17 NOTE — SUBJECTIVE & OBJECTIVE
Past Medical History:   Diagnosis Date    Bilateral ovarian cysts     Fibroids     Hx of hemorrhoids     Hypertension        Past Surgical History:   Procedure Laterality Date    APPENDECTOMY      BUNIONECTOMY      hem      tonsilectomy         Review of patient's allergies indicates:  No Known Allergies    Current Neurological Medications:     Current Facility-Administered Medications on File Prior to Encounter   Medication    [COMPLETED] iohexoL (OMNIPAQUE 350) injection 100 mL    [COMPLETED] tenecteplase (TNKase) IV KIT 16 mg    Followed by    [COMPLETED] sodium chloride 0.9% flush 10 mL     Current Outpatient Medications on File Prior to Encounter   Medication Sig    gabapentin (NEURONTIN) 100 MG capsule Take 3 capsules (300 mg total) by mouth 2 (two) times daily.    promethazine (PHENERGAN) 12.5 MG Tab Take 1 tablet (12.5 mg total) by mouth 2 (two) times a day.     Family History       Problem Relation (Age of Onset)    Breast cancer Paternal Grandmother, Maternal Aunt          Tobacco Use    Smoking status: Never    Smokeless tobacco: Never   Substance and Sexual Activity    Alcohol use: Never    Drug use: Never    Sexual activity: Yes     Partners: Male     Comment: Injection     Review of Systems   Reason unable to perform ROS: AMS.     Objective:     Vital Signs (Most Recent):  Temp: 97.3 °F (36.3 °C) (03/17/25 1630)  Pulse: 75 (03/17/25 1630)  Resp: 16 (03/17/25 1630)  BP: 116/78 (03/17/25 1630)  SpO2: 98 % (03/17/25 1630) Vital Signs (24h Range):  Temp:  [97.3 °F (36.3 °C)-98.4 °F (36.9 °C)] 97.3 °F (36.3 °C)  Pulse:  [72-86] 75  Resp:  [12-19] 16  SpO2:  [98 %-100 %] 98 %  BP: (116-149)/() 116/78     Weight: 63.5 kg (140 lb)  Body mass index is 24.03 kg/m².     Physical Exam  Constitutional:       Appearance: Normal appearance.   HENT:      Head: Normocephalic and atraumatic.      Mouth/Throat:      Mouth: Mucous membranes are moist.   Eyes:      Extraocular Movements: Extraocular movements intact.       Pupils: Pupils are equal, round, and reactive to light.   Pulmonary:      Effort: Pulmonary effort is normal.   Musculoskeletal:         General: Normal range of motion.      Cervical back: Normal range of motion and neck supple.   Skin:     General: Skin is warm and dry.      Capillary Refill: Capillary refill takes less than 2 seconds.   Neurological:      Mental Status: She is alert and oriented to person, place, and time.      Cranial Nerves: No cranial nerve deficit (RUE and RLE).      Sensory: No sensory deficit.      Motor: Weakness present.      Coordination: Coordination normal.          NEUROLOGICAL EXAMINATION:     MENTAL STATUS   Oriented to person, place, and time.     CRANIAL NERVES     CN III, IV, VI   Pupils are equal, round, and reactive to light.      Significant Labs: All pertinent lab results from the past 24 hours have been reviewed.    Significant Imaging: I have reviewed all pertinent imaging results/findings within the past 24 hours.

## 2025-03-17 NOTE — H&P
[facility] - Emergency Dept  Pulmonary Critical Care Note    Patient Name: Eugene Lopez  MRN: [id number]  Admission Date: 3/17/2025  Hospital Length of Stay: 0 days  Code Status: Full Code  Attending Provider: Michelle Spencer MD  Primary Care Provider: Aundrea Ramirez FNP     Subjective:     HPI:   Eugene Lopez 24 y.o. female with pmhx of HTN presents to the ED via transfer from outside facility after suspected CVA and given TNK. Patient reports waking up this morning at 1000 with right sided shoulder and jaw pressure that worsened, patient went back to sleep and awoke with right upper and lower extremity weakness and inability to speak. Patient denies hx of blood clots, reports fam hx of migraines on mother's side but denies personal hx of migraines. LMP was in Feb, patient has regular cycles. Has two previous miscarriages <20 weeks, has 1 living child(2 years old), . Endorses nausea, right upper and lower extremity weakness. Denies fever, cough, vomiting, CP, SOB, abdominal pain, or dysuria.     Per chart review, at outside ED, consulted with neurology who recommended TNK. TNK was given at 1408 on 3/17. Patient with improved right upper extremity weakness after TNK but continued to have right lower extremity weakness and transferred to [facility] ED for ICU admission s/p TNK.     Hospital Course/Significant events:  3/17: TNK given at 1408, admission to ICU    24 Hour Interval History:  See HPI above    Past Medical History:   Diagnosis Date    Bilateral ovarian cysts     Fibroids     Hx of hemorrhoids     Hypertension        Past Surgical History:   Procedure Laterality Date    APPENDECTOMY      BUNIONECTOMY      hem      tonsilectomy         Social History[1]        Current Outpatient Medications   Medication Instructions    gabapentin (NEURONTIN) 300 mg, Oral, 2 times daily    promethazine (PHENERGAN) 12.5 mg, Oral, 2 times daily       Review of patient's allergies indicates:  No  Known Allergies     Current Inpatient Medications   magnesium sulfate 2 g IVPB  2 g Intravenous Once    sodium chloride 0.9%  500 mL Intravenous ED 1 Time       Current Intravenous Infusions        Review of Systems   Constitutional:  Negative for chills and fever.   Respiratory:  Negative for cough and shortness of breath.    Cardiovascular:  Negative for chest pain, palpitations and leg swelling.   Gastrointestinal:  Positive for nausea. Negative for abdominal pain and vomiting.   Genitourinary:  Negative for dysuria.   Neurological:  Positive for tingling, weakness and headaches.          Objective:     No intake or output data in the 24 hours ending 03/17/25 1602      Vital Signs (Most Recent):  Temp: 98.4 °F (36.9 °C) (03/17/25 1527)  Pulse: 75 (03/17/25 1545)  Resp: 18 (03/17/25 1545)  BP: (!) 118/97 (03/17/25 1545)  SpO2: 99 % (03/17/25 1545)  Body mass index is 24.03 kg/m².  Weight: 63.5 kg (140 lb) Vital Signs (24h Range):  Temp:  [98.4 °F (36.9 °C)] 98.4 °F (36.9 °C)  Pulse:  [72-86] 75  Resp:  [12-19] 18  SpO2:  [98 %-100 %] 99 %  BP: (118-149)/() 118/97     Physical Exam  Constitutional:       Appearance: Normal appearance.   HENT:      Head: Normocephalic and atraumatic.      Mouth/Throat:      Mouth: Mucous membranes are moist.      Pharynx: No oropharyngeal exudate.   Eyes:      Extraocular Movements: Extraocular movements intact.      Pupils: Pupils are equal, round, and reactive to light.   Cardiovascular:      Rate and Rhythm: Normal rate and regular rhythm.      Pulses: Normal pulses.      Heart sounds: Normal heart sounds. No murmur heard.  Pulmonary:      Effort: Pulmonary effort is normal. No respiratory distress.      Breath sounds: Normal breath sounds. No wheezing or rales.   Abdominal:      General: Abdomen is flat. Bowel sounds are normal. There is no distension.      Palpations: Abdomen is soft.      Tenderness: There is no abdominal tenderness.   Genitourinary:     Comments:  "Deferred  Musculoskeletal:         General: No swelling.      Cervical back: Normal range of motion and neck supple.      Right lower leg: No edema.      Left lower leg: No edema.      Comments: Decreased ROM and strength on R lower extremity   Lymphadenopathy:      Cervical: No cervical adenopathy.   Skin:     General: Skin is warm and dry.      Capillary Refill: Capillary refill takes less than 2 seconds.   Neurological:      General: No focal deficit present.      Mental Status: She is alert and oriented to person, place, and time.      Cranial Nerves: No cranial nerve deficit.      Comments: CN II-XII grossly intact, bilateral upper and lower extremity sensation intact, right lower extremity 1/5 muscle strength, Left lower extremity 5/5, 5/5 bilateral upper extremities.            Lines/Drains/Airways       Peripheral Intravenous Line  Duration                  Peripheral IV - Single Lumen 03/17/25 20 G Left Antecubital <1 day         Peripheral IV - Single Lumen 03/17/25 20 G Right Antecubital <1 day                    Significant Labs:    Lab Results   Component Value Date    WBC 3.91 (L) 03/17/2025    HGB 11.3 (L) 03/17/2025    HCT 36.7 (L) 03/17/2025    MCV 76.1 (L) 03/17/2025     03/17/2025           BMP  Lab Results   Component Value Date     03/17/2025    K 4.7 03/17/2025    CO2 22 03/17/2025    BUN 10.5 03/17/2025    CREATININE 0.70 03/17/2025    CALCIUM 9.1 03/17/2025    AGAP 9.0 03/17/2025    EGFRNONAA >60 08/10/2021         ABG  No results for input(s): "PH", "PO2", "PCO2", "HCO3", "POCBASEDEF" in the last 168 hours.    Mechanical Ventilation Support:         Significant Imaging:  I have reviewed the pertinent imaging within the past 24 hours.    EXAMINATION  CT HEAD WITHOUT CONTRAST     CLINICAL HISTORY  Headache, new or worsening, neuro deficit (Age 19-49y);Patient reports increasing/severe headache post TNK administration at prior facility;     TECHNIQUE  Axial non-contrast CT images " of the head were acquired and multiplanar reconstructions accomplished by a CT technologist at a separate workstation, pushed to PACS for physician review.     COMPARISON  17 March 2025     FINDINGS  Images were reviewed in subdural, brain, soft tissue, and bone windows.     Exam quality: Motion/streak artifact limits assessment of the posterior fossa.     No development of hyperdensity to suggest acute/worsened intracranial hemorrhage. Gray-white differentiation is maintained.  No new or worsening focal mass effect or midline shift.  There are no parenchymal attenuation changes to suggest evolving ischemic infarct.  Similar appearance of the ventricular system and sulcal spaces.  No expansile extra-axial collection is identified.  Chronic/age-related intracranial changes are stable in the short interval.     Visualized paranasal sinuses and mastoid cavities are clear.  No new or worsening focal abnormality of the regional osseous structures and extracranial soft tissues.  Vascular components are unchanged by limited non-contrast CT assessment.     IMPRESSION  1. No convincing acute intracranial abnormality.  2. Additional secondary details discussed above, relatively unchanged from the comparison exam.     RADIATION DOSE  Automated tube current modulation, weight-based exposure dosing, and/or iterative reconstruction technique utilized to reach lowest reasonably achievable exposure rate.     DLP: 1906 mGy*cm        Electronically signed by:Andrei Lennon  Date:                                            03/17/2025  Time:                                           16:28    EXAMINATION:  CTA HEAD AND NECK (XPD)     CLINICAL HISTORY:  Right-sided weakness.  Unable to lift right arm of the right leg.  Possible slow cyst TIA     TECHNIQUE:  Brain and neck imaging was performed from skull base to vertex prior to intravenous contrast. CT Angiogram of head and neck was subsequently performed following intravenous contrast  administration.  Coronal and sagittal MPR reconstructions were performed in addition to coronal and sagittal MIP reconstructions.     Dose length product was 3931 mGycm. Automated exposure control was utilized to minimize radiation dose.     COMPARISON:  CT brain without contrast same date.     FINDINGS:  Carotid artery assessed in accordance with the NASCET criteria.     Image portion of the thoracic aorta is without aneurysmal dilatation or dissection.  There is no significant stenosis involving the brachiocephalic trunk and the visualized portion of the subclavian arteries.     There is unremarkable contrast opacification and contour of the common carotid arteries, internal and the external carotid arteries without intraluminal thrombus, dissection, aneurysmal prominence or hemodynamically significant stenosis.  There is unremarkable flow bilaterally within the cavernous and the supraclinoid portion internal carotid arteries.  There is also unremarkable flow within bilateral anterior cerebral arteries, middle cerebral arteries and the major branches.     There is bilateral contrast opacification of the vertebral arteries without significant stenosis and there is no intraluminal flow.  There is flow within bilateral posterior cerebral arteries.     Impression:     No hemodynamically significant flow-limiting stenosis identified.        Electronically signed by:Blane Mueller  Date:                                            03/17/2025  Time:                                           14:52    EXAMINATION:  CT HEAD FOR CODE STROKE     CLINICAL HISTORY:  Right-sided weakness.Neuro deficit, acute, stroke suspected;     TECHNIQUE:  CT imaging of the head performed from the skull base to the vertex without intravenous contrast.  DLP 1105 mGycm. Automatic exposure control, adjustment of mA/kV or iterative reconstruction technique was used to reduce radiation.     COMPARISON:  None Available.     FINDINGS:  There is no  acute cortical infarct, hemorrhage or mass lesion.  The ventricles are normal in size.     Visualized paranasal sinuses and mastoid air cells are clear.     Impression:     No acute intracranial findings.     Findings given to ER Dr. Marc at 1317 on 3/17/2025.        Electronically signed by:Enrique Del Rosario  Date:                                            03/17/2025  Time:                                           13:18  Assessment/Plan:       Assessment  Suspected CVA vs complex migraine with motor symptoms  S/p TNK at outside facility on 03/17/25 @1408      Plan  Admission to ICU due to s/p TNK for suspected CVA. CT head wo contrast with no intracranial abnormality and CTA head and neckw ith no stenosis. Patient given TNK at outside ED at 03/17 @ 1408  Neurology consulted, appreciate recommendations, q1h neuro checks, will obtain MRI brain w/wo, MRI C-spine w/wo, ECHO, lipid panel, TSH and A1c  Permissive HTN SBP<180  Stat CT head for neuro changes  HOB flat, NPO 24 hours  Repeat CT head after 24 hours for antiplatelet therapy intiation  No focal cranial nerve deficits on exam, sensation intact bilateral upper and lower extremities, improving upper and lower right extremity strength, possible complex migraine with motor dysfunction  Will give Mag, Benadryl and Compazine       DVT Prophylaxis: SCDs  GI Prophylaxis: none     32 minutes of critical care was time spent personally by me on the following activities: development of treatment plan with patient or surrogate and bedside caregivers, discussions with consultants, evaluation of patient's response to treatment, examination of patient, ordering and performing treatments and interventions, ordering and review of laboratory studies, ordering and review of radiographic studies, pulse oximetry, re-evaluation of patient's condition.  This critical care time did not overlap with that of any other provider or involve time for any procedures.     Juwan Crowder,  MD  Pulmonary Critical Care Medicine  [facility] - Emergency Dept  DOS: 03/17/2025          [1]   Social History  Socioeconomic History    Marital status: Single   Tobacco Use    Smoking status: Never    Smokeless tobacco: Never   Substance and Sexual Activity    Alcohol use: Never    Drug use: Never    Sexual activity: Yes     Partners: Male     Comment: Injection   Social History Narrative    ** Merged History Encounter **

## 2025-03-17 NOTE — ED PROVIDER NOTES
Encounter Date: 3/17/2025       History     Chief Complaint   Patient presents with    Abdominal Pain     For 3 days.     Extremity Weakness     Right sided weakness after waking up about 15 minutes ago. She is unable to lift up her right arm or right leg. .  Smile symmetrical.       This 24-year-old female presents to the emergency room complaining of right-sided weakness after she woke up from a nap that started about 15 minutes ago.  She states she is unable to move anything on the right side of her body.  The patient is unable to hold her head up with fluttering eyelids. She also c/o lower abdominal pain.       Review of patient's allergies indicates:  No Known Allergies  Past Medical History:   Diagnosis Date    Bilateral ovarian cysts     Fibroids     Hx of hemorrhoids     Hypertension      Past Surgical History:   Procedure Laterality Date    APPENDECTOMY      BUNIONECTOMY      hem      tonsilectomy       Family History   Problem Relation Name Age of Onset    Breast cancer Paternal Grandmother      Breast cancer Maternal Aunt       Social History[1]  Review of Systems   Constitutional:  Negative for fever.   HENT:  Negative for sore throat.    Respiratory:  Negative for shortness of breath.    Cardiovascular:  Negative for chest pain.   Gastrointestinal:  Negative for nausea.   Genitourinary:  Negative for dysuria.   Musculoskeletal:  Negative for back pain.   Skin:  Negative for rash.   Neurological:  Positive for weakness.   Hematological:  Does not bruise/bleed easily.       Physical Exam     Initial Vitals [03/17/25 1255]   BP Pulse Resp Temp SpO2   (!) 149/83 74 18 98.4 °F (36.9 °C) 100 %      MAP       --         Physical Exam    Nursing note and vitals reviewed.  Constitutional: She appears well-developed and well-nourished.   HENT:   Head: Normocephalic and atraumatic.   Eyes: Conjunctivae and EOM are normal. Pupils are equal, round, and reactive to light.   Neck: Neck supple.   Normal range of  motion.  Cardiovascular:  Normal rate, regular rhythm, normal heart sounds and intact distal pulses.           Pulmonary/Chest: Breath sounds normal.   Abdominal: Abdomen is soft. Bowel sounds are normal.   Musculoskeletal:         General: Normal range of motion.      Cervical back: Normal range of motion and neck supple.     Neurological: She is alert and oriented to person, place, and time. A sensory deficit (feels numb on the right) is present. She exhibits abnormal muscle tone (Not able to hold up her right arm or leg). GCS eye subscore is 4. GCS verbal subscore is 5. GCS motor subscore is 6.   Skin: Skin is warm and dry. Capillary refill takes less than 2 seconds.   Psychiatric: She has a normal mood and affect. Her behavior is normal. Judgment and thought content normal.         ED Course   Procedures  Labs Reviewed   COMPREHENSIVE METABOLIC PANEL - Abnormal       Result Value    Sodium 137      Potassium 4.7      Chloride 106      CO2 22      Glucose 89      Blood Urea Nitrogen 10.5      Creatinine 0.70      Calcium 9.1      Protein Total 8.6 (*)     Albumin 4.1      Globulin 4.5 (*)     Albumin/Globulin Ratio 0.9 (*)     Bilirubin Total 0.9      ALP 52      ALT 14      AST 26      eGFR >60      Anion Gap 9.0      BUN/Creatinine Ratio 15     CBC WITH DIFFERENTIAL - Abnormal    WBC 3.91 (*)     RBC 4.82      Hgb 11.3 (*)     Hct 36.7 (*)     MCV 76.1 (*)     MCH 23.4 (*)     MCHC 30.8 (*)     RDW 19.4 (*)     Platelet 334      MPV 10.6 (*)     Neut % 43.8      Lymph % 37.9      Mono % 9.5      Eos % 7.7      Basophil % 0.8      Imm Grans % 0.3      Neut # 1.72 (*)     Lymph # 1.48      Mono # 0.37      Eos # 0.30      Baso # 0.03      Imm Gran # 0.01     URINALYSIS, REFLEX TO URINE CULTURE - Normal    Color, UA Yellow      Appearance, UA Clear      Specific Gravity, UA 1.015      pH, UA 5.5      Protein, UA Negative      Glucose, UA Negative      Ketones, UA Negative      Blood, UA Negative      Bilirubin, UA  Negative      Urobilinogen, UA 0.2      Nitrites, UA Negative      Leukocyte Esterase, UA Negative     PREGNANCY TEST, URINE RAPID - Normal    hCG Qualitative, Urine Negative     DRUG SCREEN, URINE (BEAKER) - Normal    Amphetamines, Urine Negative      Barbiturates, Urine Negative      Benzodiazepine, Urine Negative      Cannabinoids, Urine Negative      Cocaine, Urine Negative      Opiates, Urine Negative      Phencyclidine, Urine Negative      Fentanyl, Urine Negative      pH, Urine 5.5      Specific Gravity, Urine Auto 1.015      Narrative:     Cut off concentrations:    Amphetamines - 1000 ng/ml  Barbiturates - 200 ng/ml  Benzodiazepine - 200 ng/ml  Cannabinoids (THC) - 50 ng/ml  Cocaine - 300 ng/ml  Fentanyl - 1.0 ng/ml  MDMA - 500 ng/ml  Opiates - 300 ng/ml   Phencyclidine (PCP) - 25 ng/ml    Specimen submitted for drug analysis and tested for pH and specific gravity in order to evaluate sample integrity. Suspect tampering if specific gravity is <1.003 and/or pH is not within the range of 4.5 - 8.0  False negatives may result form substances such as bleach added to urine.  False positives may result for the presence of a substance with similar chemical structure to the drug or its metabolite.    This test provides only a PRELIMINARY analytical test result. A more specific alternate chemical method must be used in order to obtain a confirmed analytical result. Gas chromatography/mass spectrometry (GC/MS) is the preferred confirmatory method. Other chemical confirmation methods are available. Clinical consideration and professional judgement should be applied to any drug of abuse test result, particularly when preliminary positive results are used.    Positive results will be confirmed only at the physicians request. Unconfirmed screening results are to be used only for medical purposes (treatment).        ALCOHOL,MEDICAL (ETHANOL) - Normal    Ethanol Level <10.0     CBC W/ AUTO DIFFERENTIAL    Narrative:      The following orders were created for panel order CBC auto differential.  Procedure                               Abnormality         Status                     ---------                               -----------         ------                     CBC with Differential[[id number]]       Abnormal            Final result                 Please view results for these tests on the individual orders.   PROTIME-INR   POCT GLUCOSE    POCT Glucose 88            Imaging Results              CTA Head and Neck (xpd) (In process)  Result time 03/17/25 14:03:38                     CT HEAD FOR CODE STROKE (Final result)  Result time 03/17/25 13:18:56      Final result by Enrique Del Rosario MD (03/17/25 13:18:56)                   Impression:      No acute intracranial findings.    Findings given to ER Dr. Marc at 1317 on 3/17/2025.      Electronically signed by: Enrique Del Rosario  Date:    03/17/2025  Time:    13:18               Narrative:    EXAMINATION:  CT HEAD FOR CODE STROKE    CLINICAL HISTORY:  Right-sided weakness.Neuro deficit, acute, stroke suspected;    TECHNIQUE:  CT imaging of the head performed from the skull base to the vertex without intravenous contrast.  DLP 1105 mGycm. Automatic exposure control, adjustment of mA/kV or iterative reconstruction technique was used to reduce radiation.    COMPARISON:  None Available.    FINDINGS:  There is no acute cortical infarct, hemorrhage or mass lesion.  The ventricles are normal in size.    Visualized paranasal sinuses and mastoid air cells are clear.                                       Medications   tenecteplase (TNKase) IV KIT 16 mg (16 mg Intravenous Given 3/17/25 1408)     Followed by   sodium chloride 0.9% flush 10 mL (10 mLs Intravenous Given 3/17/25 1400)   iohexoL (OMNIPAQUE 350) injection 100 mL (100 mLs Intravenous Given 3/17/25 1426)     Medical Decision Making  Patients symptoms persist. I spoke with Dr Cortez who recommended we procede with TNK. I discussed with  the patient and explained the benefits and risks. She wants to proceed.     Amount and/or Complexity of Data Reviewed  Labs: ordered. Decision-making details documented in ED Course.  Radiology: ordered. Decision-making details documented in ED Course.  Discussion of management or test interpretation with external provider(s): Accepted to [facility] at [facility] by Dr Lennon.     Risk  Prescription drug management.                              Thrombolysis Candidate? Yes. The risks and benefits of Tenecteplase were discussed with patient/family. Also discussed that medication is off-FDA label but that it is within the standard of care and appropriate for their treatment. The patient and/or family verbalized understanding of risks, benefits, off-label nature, and has given verbal consent for Tenecteplase. If patient was not competent, or no family was available, treatment will be administered as an emergency procedure and in what we believe to be the patients best interest. Delays to Thrombolysis?  No        Clinical Impression:  Final diagnoses:  [R53.1] Weakness  [I63.9] Cerebrovascular accident (CVA), unspecified mechanism (Primary)          ED Disposition Condition    Transfer to Another Facility Stable                  Nirav Marc MD  03/17/25 1414         [1]   Social History  Tobacco Use    Smoking status: Never    Smokeless tobacco: Never   Substance Use Topics    Alcohol use: Never    Drug use: Never        Nirav Marc MD  03/17/25 [id number]

## 2025-03-17 NOTE — HPI
Eugene Lopez is a 24 y.o. female with past medical history significant for hypertension who presented to OSH with complaints of right sided weakness after waking from a nap, 15 minutes prior to presenting to the hospital. CT head without acute abnormalities. Patient received TNK at the recommendation of Interventional Neurology, along with CTA head and neck, which was without significant flow limiting stenosis or LVO.     Upon assessment, patient reports severe HA developed during transfer and has not had much improvement following TNK. She also reports pain and pressure radiating down her neck and right arm.  Patient will be admitted to ICU for post thrombolytic monitoring. MRI brain w/wo and MRI C-spine w/wo pending.

## 2025-03-17 NOTE — Clinical Note
Diagnosis: Suspected cerebrovascular accident (CVA) [[id number]]   Admit to which facility:: [facility] [[id number]]   Reason for IP Medical Treatment  (Clinical interventions that can only be accomplished in the IP setting? ) :: ICU/post TNK

## 2025-03-17 NOTE — ASSESSMENT & PLAN NOTE
- presented with rights sided weakness, HA, neck pain and pressure radiating down right side  - Stroke RF: HTN  - Intervention: TNK 3/17  - Etiology: TBD    Stroke workup:  -CTh: No acute abnormality   -CTA h/n: without LVO or flow limiting stenosis   -MRI brain:  w/wo  -MRI C-spine w/wo   -ECHO:    -CUS:   -LDL:    -A1c:    -TSH:    -home medications include: no antiplatelets  NIH Stroke Scale      1a  Level of consciousness: 0=alert; keenly responsive   1b. LOC questions:  0=Answers both tasks correctly   1c. LOC commands: 0=Answers both tasks correctly   2.  Best Gaze: 0=normal   3.  Visual: 0=No visual loss   4. Facial Palsy: 0=Normal symmetric movement   5a.  Motor left arm: 0=No drift, limb holds 90 (or 45) degrees for full 10 seconds   5b.  Motor right arm: 1=Drift, limb holds 90 (or 45) degrees but drifts down before full 10 seconds: does not hit bed   6a. motor left le=No drift, limb holds 90 (or 45) degrees for full 10 seconds   6b  Motor right leg:  3=No effort against gravity, limb falls   7. Limb Ataxia: 0=Absent   8.  Sensory: 0=Normal; no sensory loss   9. Best Language:  0=No aphasia, normal   10. Dysarthria: 0=Normal   11. Extinction and Inattention: 0=No abnormality   12. Distal motor function: 0=Normal    Total:   4         Plan:  -continue stroke workup. MRI brain w/wo, MRI C-spine w/wo, ECHO with BS, Lipid panel, TSH, A1c pending  -permissive HTN for now ... SBP less than 180  -q1hr neuro checks  -STAT CTh for any HA or neuro change  -HOB flat, Bedrest, NPO x24 hours post TNK  -repeat CTh after 24 hours to determine if antiplatelet therapy can be initiated

## 2025-03-17 NOTE — CONSULTS
[facility]  Neurology  Consult Note    Patient Name: Eugene Lopez  MRN: [id number]  Admission Date: 3/17/2025  Hospital Length of Stay: 0 days  Code Status: Full Code   Attending Provider: No att. providers found   Consulting Provider: Andria Mtz NP  Primary Care Physician: Aundrea Ramirez FNP  Principal Problem:Suspected cerebrovascular accident (CVA)    Inpatient consult to Neurology Services (Vascular Neurology)  Consult performed by: Andria Mtz NP  Consult ordered by: Michelle Spencer MD         Subjective:     Chief Complaint:    Chief Complaint   Patient presents with    Transfer     Transfer from [facility]. Given TNK . GCS 15           HPI:   Eugene Lopez is a 24 y.o. female with past medical history significant for hypertension who presented to OSH with complaints of right sided weakness after waking from a nap, 15 minutes prior to presenting to the hospital. CT head without acute abnormalities. Patient received TNK at the recommendation of Interventional Neurology, along with CTA head and neck, which was without significant flow limiting stenosis or LVO.     Upon assessment, patient reports severe HA developed during transfer and has not had much improvement following TNK. She also reports pain and pressure radiating down her neck and right arm.  Patient will be admitted to ICU for post thrombolytic monitoring. MRI brain w/wo and MRI C-spine w/wo pending.      Past Medical History:   Diagnosis Date    Bilateral ovarian cysts     Fibroids     Hx of hemorrhoids     Hypertension        Past Surgical History:   Procedure Laterality Date    APPENDECTOMY      BUNIONECTOMY      hem      tonsilectomy         Review of patient's allergies indicates:  No Known Allergies    Current Neurological Medications:     Current Facility-Administered Medications on File Prior to Encounter   Medication    [COMPLETED] iohexoL (OMNIPAQUE 350) injection 100 mL     [COMPLETED] tenecteplase (TNKase) IV KIT 16 mg    Followed by    [COMPLETED] sodium chloride 0.9% flush 10 mL     Current Outpatient Medications on File Prior to Encounter   Medication Sig    gabapentin (NEURONTIN) 100 MG capsule Take 3 capsules (300 mg total) by mouth 2 (two) times daily.    promethazine (PHENERGAN) 12.5 MG Tab Take 1 tablet (12.5 mg total) by mouth 2 (two) times a day.     Family History       Problem Relation (Age of Onset)    Breast cancer Paternal Grandmother, Maternal Aunt          Tobacco Use    Smoking status: Never    Smokeless tobacco: Never   Substance and Sexual Activity    Alcohol use: Never    Drug use: Never    Sexual activity: Yes     Partners: Male     Comment: Injection     Review of Systems   Reason unable to perform ROS: AMS.     Objective:     Vital Signs (Most Recent):  Temp: 97.3 °F (36.3 °C) (03/17/25 1630)  Pulse: 75 (03/17/25 1630)  Resp: 16 (03/17/25 1630)  BP: 116/78 (03/17/25 1630)  SpO2: 98 % (03/17/25 1630) Vital Signs (24h Range):  Temp:  [97.3 °F (36.3 °C)-98.4 °F (36.9 °C)] 97.3 °F (36.3 °C)  Pulse:  [72-86] 75  Resp:  [12-19] 16  SpO2:  [98 %-100 %] 98 %  BP: (116-149)/() 116/78     Weight: 63.5 kg (140 lb)  Body mass index is 24.03 kg/m².     Physical Exam  Constitutional:       Appearance: Normal appearance.   HENT:      Head: Normocephalic and atraumatic.      Mouth/Throat:      Mouth: Mucous membranes are moist.   Eyes:      Extraocular Movements: Extraocular movements intact.      Pupils: Pupils are equal, round, and reactive to light.   Pulmonary:      Effort: Pulmonary effort is normal.   Musculoskeletal:         General: Normal range of motion.      Cervical back: Normal range of motion and neck supple.   Skin:     General: Skin is warm and dry.      Capillary Refill: Capillary refill takes less than 2 seconds.   Neurological:      Mental Status: She is alert and oriented to person, place, and time.      Cranial Nerves: No cranial nerve deficit (RUE  and RLE).      Sensory: No sensory deficit.      Motor: Weakness present.      Coordination: Coordination normal.          NEUROLOGICAL EXAMINATION:     MENTAL STATUS   Oriented to person, place, and time.     CRANIAL NERVES     CN III, IV, VI   Pupils are equal, round, and reactive to light.      Significant Labs: All pertinent lab results from the past 24 hours have been reviewed.    Significant Imaging: I have reviewed all pertinent imaging results/findings within the past 24 hours.  Assessment and Plan:     Right sided weakness  - presented with rights sided weakness, HA, neck pain and pressure radiating down right side  - Stroke RF: HTN  - Intervention: TNK 3/17  - Etiology: TBD    Stroke workup:  -CTh: No acute abnormality   -CTA h/n: without LVO or flow limiting stenosis   -MRI brain:  w/wo  -MRI C-spine w/wo   -ECHO:    -CUS:   -LDL:    -A1c:    -TSH:    -home medications include: no antiplatelets  NIH Stroke Scale      1a  Level of consciousness: 0=alert; keenly responsive   1b. LOC questions:  0=Answers both tasks correctly   1c. LOC commands: 0=Answers both tasks correctly   2.  Best Gaze: 0=normal   3.  Visual: 0=No visual loss   4. Facial Palsy: 0=Normal symmetric movement   5a.  Motor left arm: 0=No drift, limb holds 90 (or 45) degrees for full 10 seconds   5b.  Motor right arm: 1=Drift, limb holds 90 (or 45) degrees but drifts down before full 10 seconds: does not hit bed   6a. motor left le=No drift, limb holds 90 (or 45) degrees for full 10 seconds   6b  Motor right leg:  3=No effort against gravity, limb falls   7. Limb Ataxia: 0=Absent   8.  Sensory: 0=Normal; no sensory loss   9. Best Language:  0=No aphasia, normal   10. Dysarthria: 0=Normal   11. Extinction and Inattention: 0=No abnormality   12. Distal motor function: 0=Normal    Total:   4         Plan:  -continue stroke workup. MRI brain w/wo, MRI C-spine w/wo, ECHO with BS, Lipid panel, TSH, A1c pending  -permissive HTN for now ... SBP  less than 180  -q1hr neuro checks  -STAT CTh for any HA or neuro change  -HOB flat, Bedrest, NPO x24 hours post TNK  -repeat CTh after 24 hours to determine if antiplatelet therapy can be initiated         VTE Risk Mitigation (From admission, onward)           Ordered     Place sequential compression device  Until discontinued         03/17/25 1558     IP VTE LOW RISK PATIENT  Once         03/17/25 1558                    Thank you for your consult.  Will follow up with patient.    Andria Mtz NP  Neurology  [facility]

## 2025-03-18 LAB
ALBUMIN SERPL-MCNC: 3.8 G/DL (ref 3.5–5)
ALBUMIN/GLOB SERPL: 1.1 RATIO (ref 1.1–2)
ALP SERPL-CCNC: 55 UNIT/L (ref 40–150)
ALT SERPL-CCNC: 11 UNIT/L (ref 0–55)
ANION GAP SERPL CALC-SCNC: 11 MEQ/L
AST SERPL-CCNC: 17 UNIT/L (ref 5–34)
BASOPHILS # BLD AUTO: 0.02 X10(3)/MCL
BASOPHILS NFR BLD AUTO: 0.5 %
BILIRUB SERPL-MCNC: 1.1 MG/DL
BUN SERPL-MCNC: 8.6 MG/DL (ref 7–18.7)
CALCIUM SERPL-MCNC: 8.9 MG/DL (ref 8.4–10.2)
CHLORIDE SERPL-SCNC: 106 MMOL/L (ref 98–107)
CO2 SERPL-SCNC: 20 MMOL/L (ref 22–29)
CREAT SERPL-MCNC: 0.64 MG/DL (ref 0.55–1.02)
CREAT/UREA NIT SERPL: 13
EOSINOPHIL # BLD AUTO: 0.17 X10(3)/MCL (ref 0–0.9)
EOSINOPHIL NFR BLD AUTO: 4.1 %
ERYTHROCYTE [DISTWIDTH] IN BLOOD BY AUTOMATED COUNT: 19.2 % (ref 11.5–17)
GFR SERPLBLD CREATININE-BSD FMLA CKD-EPI: >60 ML/MIN/1.73/M2
GLOBULIN SER-MCNC: 3.5 GM/DL (ref 2.4–3.5)
GLUCOSE SERPL-MCNC: 72 MG/DL (ref 74–100)
HCT VFR BLD AUTO: 34.4 % (ref 37–47)
HGB BLD-MCNC: 10.5 G/DL (ref 12–16)
IMM GRANULOCYTES # BLD AUTO: 0.01 X10(3)/MCL (ref 0–0.04)
IMM GRANULOCYTES NFR BLD AUTO: 0.2 %
LYMPHOCYTES # BLD AUTO: 1.6 X10(3)/MCL (ref 0.6–4.6)
LYMPHOCYTES NFR BLD AUTO: 38.6 %
MAGNESIUM SERPL-MCNC: 2.3 MG/DL (ref 1.6–2.6)
MCH RBC QN AUTO: 23.1 PG (ref 27–31)
MCHC RBC AUTO-ENTMCNC: 30.5 G/DL (ref 33–36)
MCV RBC AUTO: 75.8 FL (ref 80–94)
MONOCYTES # BLD AUTO: 0.35 X10(3)/MCL (ref 0.1–1.3)
MONOCYTES NFR BLD AUTO: 8.5 %
NEUTROPHILS # BLD AUTO: 1.99 X10(3)/MCL (ref 2.1–9.2)
NEUTROPHILS NFR BLD AUTO: 48.1 %
NRBC BLD AUTO-RTO: 0 %
PHOSPHATE SERPL-MCNC: 3.7 MG/DL (ref 2.3–4.7)
PLATELET # BLD AUTO: 287 X10(3)/MCL (ref 130–400)
PMV BLD AUTO: 10.6 FL (ref 7.4–10.4)
POCT GLUCOSE: 73 MG/DL (ref 70–110)
POTASSIUM SERPL-SCNC: 3.8 MMOL/L (ref 3.5–5.1)
PROT SERPL-MCNC: 7.3 GM/DL (ref 6.4–8.3)
RBC # BLD AUTO: 4.54 X10(6)/MCL (ref 4.2–5.4)
SODIUM SERPL-SCNC: 137 MMOL/L (ref 136–145)
WBC # BLD AUTO: 4.14 X10(3)/MCL (ref 4.5–11.5)

## 2025-03-18 PROCEDURE — 51798 US URINE CAPACITY MEASURE: CPT

## 2025-03-18 PROCEDURE — 25000003 PHARM REV CODE 250: Performed by: PSYCHIATRY & NEUROLOGY

## 2025-03-18 PROCEDURE — 99233 SBSQ HOSP IP/OBS HIGH 50: CPT | Mod: ,,, | Performed by: PSYCHIATRY & NEUROLOGY

## 2025-03-18 PROCEDURE — 99900031 HC PATIENT EDUCATION (STAT)

## 2025-03-18 PROCEDURE — 25000003 PHARM REV CODE 250

## 2025-03-18 PROCEDURE — 85025 COMPLETE CBC W/AUTO DIFF WBC: CPT

## 2025-03-18 PROCEDURE — 83735 ASSAY OF MAGNESIUM: CPT

## 2025-03-18 PROCEDURE — 94760 N-INVAS EAR/PLS OXIMETRY 1: CPT

## 2025-03-18 PROCEDURE — 99900035 HC TECH TIME PER 15 MIN (STAT)

## 2025-03-18 PROCEDURE — 80053 COMPREHEN METABOLIC PANEL: CPT

## 2025-03-18 PROCEDURE — 36415 COLL VENOUS BLD VENIPUNCTURE: CPT

## 2025-03-18 PROCEDURE — 94799 UNLISTED PULMONARY SVC/PX: CPT

## 2025-03-18 PROCEDURE — 92523 SPEECH SOUND LANG COMPREHEN: CPT

## 2025-03-18 PROCEDURE — 11000001 HC ACUTE MED/SURG PRIVATE ROOM

## 2025-03-18 PROCEDURE — 97166 OT EVAL MOD COMPLEX 45 MIN: CPT

## 2025-03-18 PROCEDURE — 84100 ASSAY OF PHOSPHORUS: CPT

## 2025-03-18 PROCEDURE — 97161 PT EVAL LOW COMPLEX 20 MIN: CPT

## 2025-03-18 RX ADMIN — MUPIROCIN: 20 OINTMENT TOPICAL at 08:03

## 2025-03-18 RX ADMIN — ACETAMINOPHEN 650 MG: 325 TABLET, FILM COATED ORAL at 07:03

## 2025-03-18 RX ADMIN — ACETAMINOPHEN 650 MG: 325 TABLET, FILM COATED ORAL at 02:03

## 2025-03-18 RX ADMIN — ATORVASTATIN CALCIUM 40 MG: 40 TABLET, FILM COATED ORAL at 08:03

## 2025-03-18 NOTE — PROGRESS NOTES
[facility]  Neurology  Progress Note    Patient Name: Eugene Lopez  MRN: [id number]  Admission Date: 3/17/2025  Hospital Length of Stay: 1 days  Code Status: Full Code   Attending Provider: No att. providers found  Primary Care Physician: Aundrea Ramirez FNP   Principal Problem:Suspected cerebrovascular accident (CVA)    HPI:   Eugene Lopez is a 24 y.o. female with past medical history significant for hypertension who presented to OSH with complaints of right sided weakness after waking from a nap, 15 minutes prior to presenting to the hospital. CT head without acute abnormalities. Patient received TNK at the recommendation of Interventional Neurology, along with CTA head and neck, which was without significant flow limiting stenosis or LVO.     Upon assessment, patient reports severe HA developed during transfer and has not had much improvement following TNK. She also reports pain and pressure radiating down her neck and right arm.  Patient will be admitted to ICU for post thrombolytic monitoring. MRI brain w/wo and MRI C-spine w/wo pending.     Overview/Hospital Course:  No notes on file        Subjective:     Interval History: No new issues overnight. MRI brain and C-spine reviewed by Dr. Cortez this morning and felt to be unremarkable. Patient reports that she is back to her baseline. She is non focal on exam.     Current Neurological Medications:     Current Medications[1]    Review of Systems  Objective:     Vital Signs (Most Recent):  Temp: 98.4 °F (36.9 °C) (03/18/25 0800)  Pulse: 82 (03/18/25 0815)  Resp: 17 (03/18/25 0815)  BP: 104/71 (03/18/25 0815)  SpO2: 96 % (03/18/25 0815) Vital Signs (24h Range):  Temp:  [97.3 °F (36.3 °C)-98.9 °F (37.2 °C)] 98.4 °F (36.9 °C)  Pulse:  [68-95] 82  Resp:  [12-26] 17  SpO2:  [96 %-100 %] 96 %  BP: ()/() 104/71     Weight: 63.5 kg (140 lb)  Body mass index is 24.03 kg/m².     Physical Exam  Constitutional:       Appearance:  Normal appearance.   HENT:      Head: Normocephalic and atraumatic.      Nose: Nose normal.   Eyes:      Extraocular Movements: Extraocular movements intact.      Pupils: Pupils are equal, round, and reactive to light.   Pulmonary:      Effort: Pulmonary effort is normal.   Musculoskeletal:         General: Normal range of motion.      Cervical back: Normal range of motion and neck supple.   Skin:     General: Skin is warm and dry.      Capillary Refill: Capillary refill takes less than 2 seconds.   Neurological:      General: No focal deficit present.      Mental Status: She is alert and oriented to person, place, and time.      Cranial Nerves: Cranial nerve deficit present.      Sensory: No sensory deficit.      Motor: No weakness.   Psychiatric:         Mood and Affect: Mood normal.         Behavior: Behavior normal.          NEUROLOGICAL EXAMINATION:     MENTAL STATUS   Oriented to person, place, and time.     CRANIAL NERVES     CN III, IV, VI   Pupils are equal, round, and reactive to light.      Significant Labs: All pertinent lab results from the past 24 hours have been reviewed.    Significant Imaging: I have reviewed all pertinent imaging results/findings within the past 24 hours.    Assessment and Plan:     Right sided weakness  - presented with rights sided weakness, HA, neck pain and pressure radiating down right side  - Stroke RF: HTN  - Intervention: TNK 3/17  - Etiology: Suspect complex migraine vs functional neurological disorder vs demyelinating disorder vs cervical myelopathy vs CVA. Very low suspicion for CVA given the exam findings and imaging and young age.    Stroke workup:  -CTh: No acute abnormality   -CTA h/n: without LVO or flow limiting stenosis   -MRI brain:  w/wo, negative  -MRI C-spine w/wo negative  -ECHO:    -TCD with embolic detection  -LDL: 86   -A1c:  6  -TSH:  1.756  -home medications include: no antiplatelets  NIH Stroke Scale      1a  Level of consciousness: 0=alert; keenly  responsive   1b. LOC questions:  0=Answers both tasks correctly   1c. LOC commands: 0=Answers both tasks correctly   2.  Best Gaze: 0=normal   3.  Visual: 0=No visual loss   4. Facial Palsy: 0=Normal symmetric movement   5a.  Motor left arm: 0=No drift, limb holds 90 (or 45) degrees for full 10 seconds   5b.  Motor right arm: 0=No drift, limb holds 90 (or 45) degrees for full 10 seconds   6a. motor left le=No drift, limb holds 90 (or 45) degrees for full 10 seconds   6b  Motor right le=No drift, limb holds 90 (or 45) degrees for full 10 seconds   7. Limb Ataxia: 0=Absent   8.  Sensory: 0=Normal; no sensory loss   9. Best Language:  0=No aphasia, normal   10. Dysarthria: 0=Normal   11. Extinction and Inattention: 0=No abnormality   12. Distal motor function: 0=Normal    Total:   0         Plan:  Continue post TNK protocol until 1400 today. Ok to normalize BP when completed.   Stroke workup unremarkable. Imaging reviewed by Dr. Cortez. Official read pending  Post thrombolytic CT head pending. If negative for hemorrhage, will likely be able to discharge home. Will discuss during rounds.  PT/OT to evaluate today.  No need to follow up in stroke clinic.    Further recommendations may follow per MD.            VTE Risk Mitigation (From admission, onward)           Ordered     Place sequential compression device  Until discontinued         25 1558     IP VTE LOW RISK PATIENT  Once         25 1558                    Andria Mtz NP  Neurology  [facility]       [1]   Current Facility-Administered Medications   Medication Dose Route Frequency Provider Last Rate Last Admin    acetaminophen tablet 650 mg  650 mg Oral Q6H PRN Juwan Crowder MD   650 mg at 25 1859    atorvastatin tablet 40 mg  40 mg Oral Daily Andria Mtz NP   40 mg at 25 0846    bisacodyL suppository 10 mg  10 mg Rectal Daily PRN Andria Mtz NP        mupirocin 2 % ointment   Nasal  BID Osorio Cortez MD   Given at 03/18/25 0846    sodium chloride 0.9% flush 10 mL  10 mL Intravenous PRN Juwan Crowder MD

## 2025-03-18 NOTE — PLAN OF CARE
03/18/25 1719   Discharge Assessment   Confirmed/corrected address, phone number and insurance Yes   Confirmed Demographics Correct on Facesheet   Source of Information patient   If unable to respond/provide information was family/caregiver contacted? Yes   Contact Name/Number Lizzette cheng   Does patient/caregiver understand observation status   (inpatient)   Communicated RAFA with patient/caregiver Date not available/Unable to determine   Reason For Admission ?? CVA   People in Home parent(s);sibling(s)   Do you expect to return to your current living situation? Yes   Do you have help at home or someone to help you manage your care at home? Yes   Who are your caregiver(s) and their phone number(s)? prosper Lopez [phone]   Prior to hospitilization cognitive status: Unable to Assess   Current cognitive status: Alert/Oriented   Walking or Climbing Stairs Difficulty no   Dressing/Bathing Difficulty no   Equipment Currently Used at Home none   Readmission within 30 days? No   Patient currently being followed by outpatient case management? No   Do you currently have service(s) that help you manage your care at home? No   Do you take prescription medications? No   Do you have any problems affording any of your prescribed medications? No   Who is going to help you get home at discharge? prosper lopez   How do you get to doctors appointments? car, drives self   Are you on dialysis? No   Discharge Plan A Home with family   Discharge Plan B Home with family   DME Needed Upon Discharge  none   Discharge Plan discussed with: Patient;Parent(s)   Name(s) and Number(s) lizzette lopez [phone]   Transition of Care Barriers None   Physical Activity   On average, how many days per week do you engage in moderate to strenuous exercise (like a brisk walk)? 0 days   On average, how many minutes do you engage in exercise at this level? 0 min   Financial Resource Strain   How hard is it for you to pay for the  very basics like food, housing, medical care, and heating? Not hard   Housing Stability   In the last 12 months, was there a time when you were not able to pay the mortgage or rent on time? N   At any time in the past 12 months, were you homeless or living in a shelter (including now)? N   Transportation Needs   Has the lack of transportation kept you from medical appointments, meetings, work or from getting things needed for daily living? No   Food Insecurity   Within the past 12 months, you worried that your food would run out before you got the money to buy more. Never true   Within the past 12 months, the food you bought just didn't last and you didn't have money to get more. Never true   Utilities   In the past 12 months has the electric, gas, oil, or water company threatened to shut off services in your home? No   Health Literacy   How often do you need to have someone help you when you read instructions, pamphlets, or other written material from your doctor or pharmacy? Never   OTHER   Name(s) of People in Home pt, mom Abby,  dad renetta, brother Garrick.     Pt resides with her parents in [place] her mom Abby, her dad Renetta, Brother, Garrick and her 3 y/o son.  She is on Medicaid and does get EBT.  Disch plan is home when medicall stable

## 2025-03-18 NOTE — PLAN OF CARE
Pt resides with her parents in [place] her mom Abby, her dad Antwan, Brother, Garrick and her 3 y/o son. She is on Medicaid and does get EBT. Disch plan is home when medicall stable

## 2025-03-18 NOTE — PT/OT/SLP EVAL
Physical Therapy Evaluation and Discharge Note    Patient Name:  Eugene Lopez   MRN:  [id number]    Recommendations:     Discharge therapy intensity: No Therapy Indicated   Discharge Equipment Recommendations: none   Barriers to discharge: Ongoing medical needs    Assessment:     Eugene Lopez is a 24 y.o. female admitted with a medical diagnosis of R sided weakness, TIA vs complex migraine, s/p TNK.  Pt completed all functional mobility independently. Pt ambulated 100ft steady step through pattern with no LOB. Pt with c/o of dizziness after first 50ft. Pt's symptoms subsided with sitting. No functional mobility concerns at this time. Patient does not require further acute PT services.     Recent Surgery: * No surgery found *      Plan:     During this hospitalization, patient does not require further acute PT services.  Please re-consult if situation changes.      Subjective     Chief Complaint: headache   Patient/Family Comments/goals: return home  Pain/Comfort:  Pain Rating 1: 5/10  Location 1: head  Pain Addressed 1: Reposition, Distraction    Patients cultural, spiritual, Muslim conflicts given the current situation: no    Living Environment:  Pt lives with her parents in [facility] with no ALONDRA.   Prior to admission, patients level of function was Independent.  Equipment used at home: none.  DME owned (not currently used): none.  Upon discharge, patient will have assistance from family.    Objective:     Communicated with nsg prior to session.  Patient found HOB elevated with peripheral IV, blood pressure cuff upon PT entry to room.    General Precautions: Standard,  Orthopedic Precautions:N/A   Braces: N/A  Respiratory Status: Room air  Blood Pressure:   128/64 prior to mobility   122/76 after complaints of dizziness     Exams:  Cognitive Exam:  Patient is oriented to Person, Place, Time, and Situation  RLE ROM: WNL  RLE Strength: WNL  LLE ROM: WNL  LLE Strength: WNL  Sensation: intact     Functional  Mobility:  Bed Mobility:     Supine to Sit: independence  Transfers:     Sit to Stand:  independence with no AD  Gait: Pt amb 100ft total independently. Pt demo steady step through pattern. Pt with complaints of dizziness half way through walk. No LOB or instability noted.     AM-PAC 6 CLICK MOBILITY  Total Score:24       Treatment and Education:      Patient provided with verbal education education regarding PT role/goals/POC, fall prevention, safety awareness, and discharge/DME recommendations.  Understanding was verbalized.     Patient left sitting edge of bed with all lines intact, call button in reach, and nsg notified.    GOALS:   Multidisciplinary Problems       Physical Therapy Goals       Not on file                    History:     Past Medical History:   Diagnosis Date    Bilateral ovarian cysts     Fibroids     Hx of hemorrhoids     Hypertension        Past Surgical History:   Procedure Laterality Date    APPENDECTOMY      BUNIONECTOMY      hem      tonsilectomy         Time Tracking:     PT Received On: 03/18/25  PT Start Time: 1419     PT Stop Time: 1430  PT Total Time (min): 11 min     Billable Minutes: Evaluation Low      03/18/2025

## 2025-03-18 NOTE — PROGRESS NOTES
[facility] [address] ICU  Pulmonary/Critical Care  Progress Note  3/18/2025    Patient Name: Eugene Lopez  MRN: [id number]  Admission Date: 3/17/2025  Code Status: Full Code      Subjective:     HPI:  The patient is a 24-year-old female with a history of hypertension, presented to ER in [facility] 03/17/2025 with right upper and right lower extremity weakness with concurrent inability to speak.  She reports awakening around 10:30 a.m. with some unusual feeling right side of face, went back to sleep and awoke again 45 minutes later with inability to move her right arm or right leg, and was unable to communicate with her mother.  CT brain at [facility] ER reported as normal.  She received IV TNK at 2:08 p.m..  She was transferred to [facility] ER afterward.  She was then admitted to ICU for continued frequent neurologic assessments.     She states that she has a headache yesterday at [facility] ER.  Headache described as unilateral, right-sided, and she still notes some mild pain.  She states that she does not have chronic headaches, and has never been diagnosed with migraines.  She states that her mother does have a diagnosis of migraines.    Hospital Course:      24hr Interval History:  I have extensively reviewed this patient's hospital presentation and ICU stay thus far, as being seen by me for the 1st time this a.m..    Scheduled Medications:   atorvastatin  40 mg Oral Daily    mupirocin   Nasal BID     PRN Medications:    Current Facility-Administered Medications:     acetaminophen, 650 mg, Oral, Q6H PRN    bisacodyL, 10 mg, Rectal, Daily PRN    sodium chloride 0.9%, 10 mL, Intravenous, PRN  Continuous Infusions:      Past Medical History:   Diagnosis Date    Bilateral ovarian cysts     Fibroids     Hx of hemorrhoids     Hypertension        Past Surgical History:   Procedure Laterality Date    APPENDECTOMY      BUNIONECTOMY      hem      tonsilectomy         Objective:      Input/output:    Intake/Output Summary (Last 24 hours) at 3/18/2025 0739  Last data filed at 3/18/2025 0624  Gross per 24 hour   Intake 36.6 ml   Output --   Net 36.6 ml       Vital Signs (Most Recent):  Temp: 98.9 °F (37.2 °C) (03/18/25 0400)  Pulse: 71 (03/18/25 0615)  Resp: 19 (03/18/25 0615)  BP: (!) 123/58 (03/18/25 0615)  SpO2: 98 % (03/18/25 0615)  Body mass index is 24.03 kg/m².  Weight: 63.5 kg (140 lb) Vital Signs (24h Range):  Temp:  [97.3 °F (36.3 °C)-98.9 °F (37.2 °C)] 98.9 °F (37.2 °C)  Pulse:  [68-93] 71  Resp:  [12-26] 19  SpO2:  [96 %-100 %] 98 %  BP: ()/() 123/58     Physical Exam  Constitutional:       Appearance: Normal appearance.   Eyes:      Conjunctiva/sclera: Conjunctivae normal.      Pupils: Pupils are equal, round, and reactive to light.   Cardiovascular:      Rate and Rhythm: Normal rate and regular rhythm.      Heart sounds: No murmur heard.  Pulmonary:      Breath sounds: No wheezing, rhonchi or rales.   Abdominal:      General: Bowel sounds are normal.      Palpations: Abdomen is soft.   Musculoskeletal:      Right lower leg: No edema.      Left lower leg: No edema.   Lymphadenopathy:      Cervical: No cervical adenopathy.   Skin:     General: Skin is warm and dry.   Neurological:      General: No focal deficit present.      Mental Status: She is alert and oriented to person, place, and time.         Lines/Drains/Airways       Peripheral Intravenous Line  Duration                  Peripheral IV - Single Lumen 03/17/25 20 G Left Antecubital 1 day         Peripheral IV - Single Lumen 03/17/25 20 G Right Antecubital 1 day                  Significant Labs:    Lab Results   Component Value Date    WBC 4.14 (L) 03/18/2025    HGB 10.5 (L) 03/18/2025    HCT 34.4 (L) 03/18/2025    MCV 75.8 (L) 03/18/2025     03/18/2025         Recent Labs   Lab 03/17/25  1309 03/17/25  1712 03/18/25  2854     --  137   K 4.7  --  3.8     --  106   CO2 22  --  20*   BUN 10.5   --  8.6   CREATININE 0.70  --  0.64   CALCIUM 9.1  --  8.9   MG  --   --  2.30   PHOS  --   --  3.7   TRIG  --  41  --    AST 26  --  17   ALT 14  --  11   ALKPHOS 52  --  55   ALBUMIN 4.1  --  3.8   INR 1.0  --   --      Imaging:   Images/reports reviewed    Assessment:     Acute onset right-sided weakness with headache 03/17/2025, received IV TNK.  Differential diagnostic possibilities include ischemic stroke versus migraine variant.  No current neurologic deficits    Plan:     Continue ICU observation with frequent neurologic assessments  As per Neurology recommendations, stroke service closely following  MRI has been obtained with results pending       Jacqueline Fox MD, [facility]  Pulmonary/Critical Care

## 2025-03-18 NOTE — PT/OT/SLP EVAL
Occupational Therapy   Evaluation and Discharge Note    Name: Eugene Lopez  MRN: [id number]    Recommendations:     Discharge therapy intensity: No Therapy Indicated   Discharge Equipment Recommendations: none  Barriers to discharge:  None    Assessment:     Eugene Lopez is a 24 y.o. female with a medical diagnosis of R sided weakness, TIA vs complex migraine, s/p TNK. On eval, patient presents at her functional baseline. Skilled OT services are not warranted at this time.    Plan:     OT to sign off as acute OT services are not warranted at this time.  Please re-consult if situation changes during this hospitalization.    Plan of Care Reviewed with: patient, family    Subjective     Chief Complaint: dizziness  Patient/Family Comments/goals: to go home    Occupational Profile:  Living Environment: lives with parents in a SLH, no ALONDRA; tub/shower  Previous level of function: ind  Roles and Routines: mother, daughter  Equipment Used at Home: none  Assistance upon Discharge: family    Pain/Comfort:  Pain Rating 1: 5/10  Location 1:  (headache)  Pain Addressed 1: Reposition, Distraction, Nurse notified    Patients cultural, spiritual, Worship conflicts given the current situation: no    Objective:     OT communicated with NSG prior to session.      Patient was found HOB elevated with peripheral IV upon OT entry to room.    General Precautions: Standard  Orthopedic Precautions: N/A  Braces: N/A  Room air  Vital Signs: 128/64 97%; after dizzy spell 122/76    Bed Mobility:    Patient completed Supine to Sit with independence    Functional Mobility/Transfers:  Patient completed Sit <> Stand Transfer with independence  with  no assistive device   Functional Mobility: ambulating ~100 ft with ind; did report dizziness after ~50ft, however no LOB or unsteadiness noted    Activities of Daily Living:  Feeding:  independence    Lower Body Dressing: independence to doff/don socks    AMPA 6 Click ADL:  AMPAC Total Score:  24    Functional Cognition:  Intact    Visual Perceptual Skills:  Intact    Upper Extremity Function:  Right Upper Extremity:   WFL    Left Upper Extremity:  WFL    Therapeutic Positioning  Risk for acquired pressure injuries is decreased due to intact sensation, continence, and ability to mobilize independently .    OT interventions performed during the course of today's session in an effort to prevent and/or reduce acquired pressure injuries:   Education was provided on benefits of and recommendations for therapeutic positioning    Skin assessment: all bony prominences were assessed    Findings: no redness or breakdown noted    OT recommendations for therapeutic positioning throughout hospitalization:   Follow [facility] Pressure Injury Prevention Protocol    Patient Education:  Patient provided with verbal education education regarding OT role/goals/POC, fall prevention, safety awareness, Discharge/DME recommendations, and pressure ulcer prevention.  Understanding was verbalized.     Patient left sitting edge of bed with all lines intact, call button in reach, and NSG notified.    History:     Past Medical History:   Diagnosis Date    Bilateral ovarian cysts     Fibroids     Hx of hemorrhoids     Hypertension          Past Surgical History:   Procedure Laterality Date    APPENDECTOMY      BUNIONECTOMY      hem      tonsilectomy         Time Tracking:     OT Date of Treatment:    OT Start Time: 1418  OT Stop Time: 1429  OT Total Time (min): 11 min    Billable Minutes:Evaluation mod    3/18/2025

## 2025-03-18 NOTE — ASSESSMENT & PLAN NOTE
- presented with rights sided weakness, HA, neck pain and pressure radiating down right side  - Stroke RF: HTN  - Intervention: TNK 3/17  - Etiology: Suspect complex migraine vs functional neurological disorder vs demyelinating disorder vs cervical myelopathy vs CVA. Very low suspicion for CVA given the exam findings and imaging and young age.    Stroke workup:  -CTh: No acute abnormality   -CTA h/n: without LVO or flow limiting stenosis   -MRI brain:  w/wo, negative  -MRI C-spine w/wo negative  -ECHO:    -TCD with embolic detection  -LDL: 86   -A1c:  6  -TSH:  1.756  -home medications include: no antiplatelets  NIH Stroke Scale      1a  Level of consciousness: 0=alert; keenly responsive   1b. LOC questions:  0=Answers both tasks correctly   1c. LOC commands: 0=Answers both tasks correctly   2.  Best Gaze: 0=normal   3.  Visual: 0=No visual loss   4. Facial Palsy: 0=Normal symmetric movement   5a.  Motor left arm: 0=No drift, limb holds 90 (or 45) degrees for full 10 seconds   5b.  Motor right arm: 0=No drift, limb holds 90 (or 45) degrees for full 10 seconds   6a. motor left le=No drift, limb holds 90 (or 45) degrees for full 10 seconds   6b  Motor right le=No drift, limb holds 90 (or 45) degrees for full 10 seconds   7. Limb Ataxia: 0=Absent   8.  Sensory: 0=Normal; no sensory loss   9. Best Language:  0=No aphasia, normal   10. Dysarthria: 0=Normal   11. Extinction and Inattention: 0=No abnormality   12. Distal motor function: 0=Normal    Total:   0         Plan:  Continue post TNK protocol until 1400 today. Ok to normalize BP when completed.   Stroke workup unremarkable. Imaging reviewed by Dr. Cortez. Official read pending  Post thrombolytic CT head pending. If negative for hemorrhage, will likely be able to discharge home. Will discuss during rounds.  PT/OT to evaluate today.  No need to follow up in stroke clinic.    Further recommendations may follow per MD.

## 2025-03-18 NOTE — PT/OT/SLP EVAL
[facility]  Speech Language Pathology Department  Cognitive-Communication Evaluation    Patient Name:  Eugene Lopez   MRN:  [id number]    Recommendations     General recommendations:  SLP intervention not indicated  Communication strategies:  none  Barriers to safe discharge: none    History     Eugene Lopez is a/n 24 y.o. female admitted with complaints of right sided weakness. CT head without acute abnormalities. Patient received TNK. Pt passed swallow screen.     Past Medical History:   Diagnosis Date    Bilateral ovarian cysts     Fibroids     Hx of hemorrhoids     Hypertension      Past Surgical History:   Procedure Laterality Date    APPENDECTOMY      BUNIONECTOMY      hem      tonsilectomy         Previous level of Function  Education:some college  Occupation: unemployed  Lives:  with family  Handed: Right  Glasses: yes  Hearing Aids: no    Subjective     Patient awake and alert.  Spiritual/Cultural/Orthodox Beliefs/Practices that affect care:  no    Pain/Comfort:  0/10    Objective     ORAL MUSCULATURE  Dentition: own teeth  Facial Movement: WFL  Buccal Strength & Mobility: WFL  Mandibular Strength & Mobility: WFL    SPEECH PRODUCTION  Phoneme Production: adequate  Voice Quality: adequate  Voice Production: adequate  Speech Rate: appropriate  Loudness: acceptable  Speech Intelligibility  Known Context: Greater that 90%  Unknown Context: Greater that 90%    AUDITORY COMPREHENSION  Identification:  Body parts: 100%  Objects: 100%  Following Directions:  1-Step: 100%  2-Step: 100%  Yes/No Questions:  Biographical: 100%  Environmental: 100%  Simple: 100%  Complex: 100%    VERBAL EXPRESSION  Automatic Speech:  Days of the week: 100%  Months of the year: 100%    Phrase Completion: 100%  Confrontation Naming  Body Parts: 100%  Objects: 100%  Wh- Questions:  Object name: 100%  Object function: 100%    COGNITION  Orientation:  Person: yes  Place: yes  Time: yes  Situation: yes    Attention:  Focused: Within Functional Limits  Sustained: Within Functional Limits  Memory:  Immediate: Within Functional Limits  Delayed: Within Functional Limits  Problem Solving  Functional simple: Within Functional Limits  Functional complex: Within Functional Limits  Organization:  Convergent thinking: Within Functional Limits  Divergent thinking: Within Functional Limits      Assessment     Pt presents with functional speech and language skills, cognitive lingustic skills note to be at baseline. No skilled SLP intervention is warranted at this time.     Patient Education     Patient provided with verbal education regarding PCO.  Understanding was verbalized.     Time Tracking     SLP Treatment Date:   03/18/25  Speech Start Time:  1350  Speech Stop Time:  1405     Speech Total Time (min):  15 min    Billable minutes:  Swallow and Oral Function Evaluation, 15 minutes     03/18/2025

## 2025-03-18 NOTE — SUBJECTIVE & OBJECTIVE
Subjective:     Interval History: No new issues overnight. MRI brain and C-spine reviewed by Dr. Cortez this morning and felt to be unremarkable. Patient reports that she is back to her baseline. She is non focal on exam.     Current Neurological Medications:     Current Medications[1]    Review of Systems  Objective:     Vital Signs (Most Recent):  Temp: 98.4 °F (36.9 °C) (03/18/25 0800)  Pulse: 82 (03/18/25 0815)  Resp: 17 (03/18/25 0815)  BP: 104/71 (03/18/25 0815)  SpO2: 96 % (03/18/25 0815) Vital Signs (24h Range):  Temp:  [97.3 °F (36.3 °C)-98.9 °F (37.2 °C)] 98.4 °F (36.9 °C)  Pulse:  [68-95] 82  Resp:  [12-26] 17  SpO2:  [96 %-100 %] 96 %  BP: ()/() 104/71     Weight: 63.5 kg (140 lb)  Body mass index is 24.03 kg/m².     Physical Exam  Constitutional:       Appearance: Normal appearance.   HENT:      Head: Normocephalic and atraumatic.      Nose: Nose normal.   Eyes:      Extraocular Movements: Extraocular movements intact.      Pupils: Pupils are equal, round, and reactive to light.   Pulmonary:      Effort: Pulmonary effort is normal.   Musculoskeletal:         General: Normal range of motion.      Cervical back: Normal range of motion and neck supple.   Skin:     General: Skin is warm and dry.      Capillary Refill: Capillary refill takes less than 2 seconds.   Neurological:      General: No focal deficit present.      Mental Status: She is alert and oriented to person, place, and time.      Cranial Nerves: Cranial nerve deficit present.      Sensory: No sensory deficit.      Motor: No weakness.   Psychiatric:         Mood and Affect: Mood normal.         Behavior: Behavior normal.          NEUROLOGICAL EXAMINATION:     MENTAL STATUS   Oriented to person, place, and time.     CRANIAL NERVES     CN III, IV, VI   Pupils are equal, round, and reactive to light.      Significant Labs: All pertinent lab results from the past 24 hours have been reviewed.    Significant Imaging: I have reviewed all  pertinent imaging results/findings within the past 24 hours.       [1]   Current Facility-Administered Medications   Medication Dose Route Frequency Provider Last Rate Last Admin    acetaminophen tablet 650 mg  650 mg Oral Q6H PRN Juwan Crowder MD   650 mg at 03/17/25 1859    atorvastatin tablet 40 mg  40 mg Oral Daily Andria Mtz NP   40 mg at 03/18/25 0846    bisacodyL suppository 10 mg  10 mg Rectal Daily PRN Andria Mtz NP        mupirocin 2 % ointment   Nasal BID Osorio Cortez MD   Given at 03/18/25 0846    sodium chloride 0.9% flush 10 mL  10 mL Intravenous PRN Juwan Crowder MD

## 2025-03-19 VITALS
BODY MASS INDEX: 23.9 KG/M2 | OXYGEN SATURATION: 100 % | TEMPERATURE: 98 F | HEIGHT: 64 IN | RESPIRATION RATE: 24 BRPM | DIASTOLIC BLOOD PRESSURE: 73 MMHG | HEART RATE: 91 BPM | WEIGHT: 140 LBS | SYSTOLIC BLOOD PRESSURE: 116 MMHG

## 2025-03-19 PROBLEM — G43.409 MIGRAINE, HEMIPLEGIC: Status: ACTIVE | Noted: 2025-03-19

## 2025-03-19 LAB
ALBUMIN SERPL-MCNC: 3.7 G/DL (ref 3.5–5)
ALBUMIN/GLOB SERPL: 1.2 RATIO (ref 1.1–2)
ALP SERPL-CCNC: 48 UNIT/L (ref 40–150)
ALT SERPL-CCNC: 10 UNIT/L (ref 0–55)
ANION GAP SERPL CALC-SCNC: 10 MEQ/L
APICAL FOUR CHAMBER EJECTION FRACTION: 54 %
APICAL TWO CHAMBER EJECTION FRACTION: 60 %
AST SERPL-CCNC: 13 UNIT/L (ref 5–34)
AV INDEX (PROSTH): 0.64
AV MEAN GRADIENT: 5 MMHG
AV PEAK GRADIENT: 9 MMHG
AV VALVE AREA BY VELOCITY RATIO: 2.1 CM²
AV VALVE AREA: 2 CM²
AV VELOCITY RATIO: 0.67
BASOPHILS # BLD AUTO: 0.03 X10(3)/MCL
BASOPHILS NFR BLD AUTO: 0.7 %
BILIRUB SERPL-MCNC: 0.8 MG/DL
BSA FOR ECHO PROCEDURE: 1.69 M2
BUN SERPL-MCNC: 12 MG/DL (ref 7–18.7)
CALCIUM SERPL-MCNC: 8.7 MG/DL (ref 8.4–10.2)
CHLORIDE SERPL-SCNC: 103 MMOL/L (ref 98–107)
CO2 SERPL-SCNC: 21 MMOL/L (ref 22–29)
CREAT SERPL-MCNC: 0.62 MG/DL (ref 0.55–1.02)
CREAT/UREA NIT SERPL: 19
CRP SERPL-MCNC: <1 MG/L
CV ECHO LV RWT: 0.44 CM
DOP CALC AO PEAK VEL: 1.5 M/S
DOP CALC AO VTI: 29.1 CM
DOP CALC LVOT AREA: 3.1 CM2
DOP CALC LVOT DIAMETER: 2 CM
DOP CALC LVOT PEAK VEL: 1 M/S
DOP CALC LVOT STROKE VOLUME: 58.4 CM3
DOP CALC MV VTI: 26.6 CM
DOP CALCLVOT PEAK VEL VTI: 18.6 CM
E WAVE DECELERATION TIME: 235 MSEC
E/A RATIO: 1.66
E/E' RATIO: 4 M/S
ECHO LV POSTERIOR WALL: 0.9 CM (ref 0.6–1.1)
EOSINOPHIL # BLD AUTO: 0.32 X10(3)/MCL (ref 0–0.9)
EOSINOPHIL NFR BLD AUTO: 7.4 %
ERYTHROCYTE [DISTWIDTH] IN BLOOD BY AUTOMATED COUNT: 18.8 % (ref 11.5–17)
ERYTHROCYTE [SEDIMENTATION RATE] IN BLOOD: 7 MM/HR (ref 0–20)
FRACTIONAL SHORTENING: 31.7 % (ref 28–44)
GFR SERPLBLD CREATININE-BSD FMLA CKD-EPI: >60 ML/MIN/1.73/M2
GLOBULIN SER-MCNC: 3.2 GM/DL (ref 2.4–3.5)
GLUCOSE SERPL-MCNC: 87 MG/DL (ref 74–100)
HCT VFR BLD AUTO: 32.2 % (ref 37–47)
HGB BLD-MCNC: 10 G/DL (ref 12–16)
HR MV ECHO: 73 BPM
IMM GRANULOCYTES # BLD AUTO: 0.02 X10(3)/MCL (ref 0–0.04)
IMM GRANULOCYTES NFR BLD AUTO: 0.5 %
INTERVENTRICULAR SEPTUM: 0.8 CM (ref 0.6–1.1)
LEFT ATRIUM AREA SYSTOLIC (APICAL 2 CHAMBER): 11.9 CM2
LEFT ATRIUM AREA SYSTOLIC (APICAL 4 CHAMBER): 13.2 CM2
LEFT ATRIUM SIZE: 2.4 CM
LEFT ATRIUM VOLUME INDEX MOD: 15 ML/M2
LEFT ATRIUM VOLUME MOD: 26 ML
LEFT INTERNAL DIMENSION IN SYSTOLE: 2.8 CM (ref 2.1–4)
LEFT VENTRICLE DIASTOLIC VOLUME INDEX: 44.05 ML/M2
LEFT VENTRICLE DIASTOLIC VOLUME: 74 ML
LEFT VENTRICLE END DIASTOLIC VOLUME APICAL 2 CHAMBER: 89 ML
LEFT VENTRICLE END DIASTOLIC VOLUME APICAL 4 CHAMBER: 81.1 ML
LEFT VENTRICLE END SYSTOLIC VOLUME APICAL 2 CHAMBER: 23.9 ML
LEFT VENTRICLE END SYSTOLIC VOLUME APICAL 4 CHAMBER: 24.1 ML
LEFT VENTRICLE MASS INDEX: 62.9 G/M2
LEFT VENTRICLE SYSTOLIC VOLUME INDEX: 17.9 ML/M2
LEFT VENTRICLE SYSTOLIC VOLUME: 30 ML
LEFT VENTRICULAR INTERNAL DIMENSION IN DIASTOLE: 4.1 CM (ref 3.5–6)
LEFT VENTRICULAR MASS: 105.6 G
LV LATERAL E/E' RATIO: 3.4 M/S
LV SEPTAL E/E' RATIO: 5.9 M/S
LVED V (TEICH): 74.2 ML
LVES V (TEICH): 29.6 ML
LVOT MG: 2 MMHG
LVOT MV: 0.63 CM/S
LYMPHOCYTES # BLD AUTO: 1.74 X10(3)/MCL (ref 0.6–4.6)
LYMPHOCYTES NFR BLD AUTO: 40 %
MAGNESIUM SERPL-MCNC: 2.1 MG/DL (ref 1.6–2.6)
MCH RBC QN AUTO: 23.4 PG (ref 27–31)
MCHC RBC AUTO-ENTMCNC: 31.1 G/DL (ref 33–36)
MCV RBC AUTO: 75.2 FL (ref 80–94)
MONOCYTES # BLD AUTO: 0.43 X10(3)/MCL (ref 0.1–1.3)
MONOCYTES NFR BLD AUTO: 9.9 %
MV MEAN GRADIENT: 2 MMHG
MV PEAK A VEL: 0.53 M/S
MV PEAK E VEL: 0.88 M/S
MV PEAK GRADIENT: 4 MMHG
MV STENOSIS PRESSURE HALF TIME: 89 MS
MV VALVE AREA BY CONTINUITY EQUATION: 2.2 CM2
MV VALVE AREA P 1/2 METHOD: 2.47 CM2
NEUTROPHILS # BLD AUTO: 1.81 X10(3)/MCL (ref 2.1–9.2)
NEUTROPHILS NFR BLD AUTO: 41.5 %
NRBC BLD AUTO-RTO: 0 %
OHS LV EJECTION FRACTION SIMPSONS BIPLANE MOD: 58 %
PHOSPHATE SERPL-MCNC: 3.9 MG/DL (ref 2.3–4.7)
PISA TR MAX VEL: 1.9 M/S
PLATELET # BLD AUTO: 264 X10(3)/MCL (ref 130–400)
PMV BLD AUTO: 10 FL (ref 7.4–10.4)
POTASSIUM SERPL-SCNC: 3.8 MMOL/L (ref 3.5–5.1)
PROT SERPL-MCNC: 6.9 GM/DL (ref 6.4–8.3)
RA PRESSURE ESTIMATED: 3 MMHG
RBC # BLD AUTO: 4.28 X10(6)/MCL (ref 4.2–5.4)
RV TB RVSP: 5 MMHG
SINUS: 2.5 CM
SODIUM SERPL-SCNC: 134 MMOL/L (ref 136–145)
TDI LATERAL: 0.26 M/S
TDI SEPTAL: 0.15 M/S
TDI: 0.21 M/S
TR MAX PG: 15 MMHG
TRICUSPID ANNULAR PLANE SYSTOLIC EXCURSION: 1.97 CM
TV REST PULMONARY ARTERY PRESSURE: 17 MMHG
WBC # BLD AUTO: 4.35 X10(3)/MCL (ref 4.5–11.5)
Z-SCORE OF LEFT VENTRICULAR DIMENSION IN END DIASTOLE: -1.34
Z-SCORE OF LEFT VENTRICULAR DIMENSION IN END SYSTOLE: -0.29

## 2025-03-19 PROCEDURE — 94799 UNLISTED PULMONARY SVC/PX: CPT

## 2025-03-19 PROCEDURE — 99900031 HC PATIENT EDUCATION (STAT)

## 2025-03-19 PROCEDURE — 25000003 PHARM REV CODE 250

## 2025-03-19 PROCEDURE — 36415 COLL VENOUS BLD VENIPUNCTURE: CPT

## 2025-03-19 PROCEDURE — 86140 C-REACTIVE PROTEIN: CPT | Performed by: INTERNAL MEDICINE

## 2025-03-19 PROCEDURE — 85025 COMPLETE CBC W/AUTO DIFF WBC: CPT

## 2025-03-19 PROCEDURE — 83735 ASSAY OF MAGNESIUM: CPT

## 2025-03-19 PROCEDURE — 84100 ASSAY OF PHOSPHORUS: CPT

## 2025-03-19 PROCEDURE — 36415 COLL VENOUS BLD VENIPUNCTURE: CPT | Performed by: INTERNAL MEDICINE

## 2025-03-19 PROCEDURE — 99900035 HC TECH TIME PER 15 MIN (STAT)

## 2025-03-19 PROCEDURE — 85652 RBC SED RATE AUTOMATED: CPT | Performed by: INTERNAL MEDICINE

## 2025-03-19 PROCEDURE — 80053 COMPREHEN METABOLIC PANEL: CPT

## 2025-03-19 RX ORDER — BUTALBITAL, ACETAMINOPHEN AND CAFFEINE 50; 325; 40 MG/1; MG/1; MG/1
1 TABLET ORAL EVERY 6 HOURS PRN
Qty: 30 TABLET | Refills: 1 | Status: SHIPPED | OUTPATIENT
Start: 2025-03-19 | End: 2025-04-18

## 2025-03-19 RX ORDER — METOCLOPRAMIDE HYDROCHLORIDE 5 MG/ML
5 INJECTION INTRAMUSCULAR; INTRAVENOUS EVERY 6 HOURS PRN
Status: DISCONTINUED | OUTPATIENT
Start: 2025-03-19 | End: 2025-03-19 | Stop reason: HOSPADM

## 2025-03-19 RX ORDER — ASPIRIN 81 MG/1
81 TABLET ORAL DAILY
Qty: 30 TABLET | Refills: 11 | Status: SHIPPED | OUTPATIENT
Start: 2025-03-19 | End: 2026-03-19

## 2025-03-19 RX ORDER — BUTALBITAL, ACETAMINOPHEN AND CAFFEINE 50; 325; 40 MG/1; MG/1; MG/1
1 TABLET ORAL EVERY 4 HOURS PRN
Status: DISCONTINUED | OUTPATIENT
Start: 2025-03-19 | End: 2025-03-19 | Stop reason: HOSPADM

## 2025-03-19 RX ADMIN — ATORVASTATIN CALCIUM 40 MG: 40 TABLET, FILM COATED ORAL at 10:03

## 2025-03-19 NOTE — PT/OT/SLP PROGRESS
Physical Therapy Treatment    Patient Name:  Eugene Lopez   MRN:  [id number]    Patient independent with mobility. She was eval/discharged by PT yesterday. We are signing off again.

## 2025-03-19 NOTE — H&P
[facility]  Hospital Medicine History & Physical Examination       Patient Name: Eugene Lopez  MRN: [id number]  Patient Class: IP- Inpatient   Admission Date: 03/19/2025   Admitting Service: Hospital Medicine   Length of Stay: 2  Attending Physician: Babita Galicia MD   Primary Care Provider: Aundrea Ramirez FNP  Face-to-Face encounter date: 03/19/2025  Code Status: Full code   Chief Complaint: Transfer (Transfer from [facility] ICU. Given TNK . GCS 15 )      Screening for Social Drivers for health:  Patient screened for food insecurity, housing instability, transportation needs, utility difficulties, and interpersonal safety (select all that apply as identified as concern)  []Housing or Food  []Transportation Needs  []Utility Difficulties  []Interpersonal safety  [x]None    Present at Bedside:  Patient information was obtained from patient, patient's family, past medical records and ER records.      HISTORY OF PRESENT ILLNESS:   Eugene Lopez is a 24 y.o. female with a past medical history of hypertension who presented to [facility] on 3/17/2025 transferred from [facility] for neurology services.  Patient presented to outside facility on 03/17/2025 with right-sided weakness after waking up from a nap.  CT head revealed no acute abnormalities.  CTA head and neck revealed no hemodynamically significant flow-limiting stenosis identified.  Tele neurology was consulted with recommendations of TNK.  Patient was transferred to [facility] for higher level of care.  Patient reported headache with pain and pressure radiating down her neck and right arm.  Patient was admitted to ICU for close monitoring.  Neurology was consulted.  MRI brain with and without contrast revealed no acute abnormality.  MRI cervical spine with and without contrast revealed no cord signal abnormality or abnormal enhancement; no significant spinal canal or neuroforaminal stenosis.  Repeat CT head on 03/18 revealed  no acute intracranial findings. Patient was cleared for downgrade out of ICU on 03/18/2020 and admitted to hospital medicine service.     PAST MEDICAL HISTORY:     Past Medical History:   Diagnosis Date    Bilateral ovarian cysts     Fibroids     Hx of hemorrhoids     Hypertension        PAST SURGICAL HISTORY:     Past Surgical History:   Procedure Laterality Date    APPENDECTOMY      BUNIONECTOMY      hem      tonsilectomy         FAMILY HISTORY:   Reviewed and negative    SOCIAL HISTORY:   Denied alcohol, tobacco or illicit drug use.     ALLERGIES:   Patient has no known allergies.    HOME MEDICATIONS:     Prior to Admission medications    Medication Sig Start Date End Date Taking? Authorizing Provider   gabapentin (NEURONTIN) 100 MG capsule Take 3 capsules (300 mg total) by mouth 2 (two) times daily. 12/3/24 12/3/25  Aundrea Ramirez FNP   promethazine (PHENERGAN) 12.5 MG Tab Take 1 tablet (12.5 mg total) by mouth 2 (two) times a day. 7/22/24   Nikolas Boudreaux MD     ________________________________________________________________________  INPATIENT LIST OF MEDICATIONS   Current Medications[1]    Scheduled Meds:   atorvastatin  40 mg Oral Daily    mupirocin   Nasal BID     Continuous Infusions:  PRN Meds:.  Current Facility-Administered Medications:     acetaminophen, 650 mg, Oral, Q6H PRN    bisacodyL, 10 mg, Rectal, Daily PRN    sodium chloride 0.9%, 10 mL, Intravenous, PRN      REVIEW OF SYSTEMS:   Except as documented, all other systems reviewed and negative.    PHYSICAL EXAM:     VITAL SIGNS: 24 HRS MIN & MAX LAST   Temp  Min: 98.6 °F (37 °C)  Max: 98.9 °F (37.2 °C) 98.7 °F (37.1 °C)   BP  Min: 85/57  Max: 122/76 (!) 110/59   Pulse  Min: 71  Max: 92  78   Resp  Min: 14  Max: 22 17   SpO2  Min: 96 %  Max: 99 % 98 %       General appearance: Female in no apparent distress.  HENT: Atraumatic head.   Eyes: Normal extraocular movements.   Neck: Supple.   Lungs: Clear to auscultation bilaterally.   Heart: Regular  rate and rhythm.  Abdomen: Soft, non-distended, non-tender.  Extremities: No cyanosis, clubbing, or deformities.   Skin: No Rash.   Neuro: Awake, alert, and oriented. Motor and sensory exams grossly intact. No facial droop. No slurred speech.  Strength equal and symmetric in bilateral upper and lower extremities   Psych/mental status: Appropriate mood and affect. Responds appropriately to questions.     LABS AND IMAGING:     Recent Labs   Lab 03/17/25  1309 03/18/25  0421 03/19/25  0400   WBC 3.91* 4.14* 4.35*   RBC 4.82 4.54 4.28   HGB 11.3* 10.5* 10.0*   HCT 36.7* 34.4* 32.2*   MCV 76.1* 75.8* 75.2*   MCH 23.4* 23.1* 23.4*   MCHC 30.8* 30.5* 31.1*   RDW 19.4* 19.2* 18.8*    287 264   MPV 10.6* 10.6* 10.0       Recent Labs   Lab 03/17/25  1309 03/18/25  0421 03/19/25  0400    137 134*   K 4.7 3.8 3.8    106 103   CO2 22 20* 21*   BUN 10.5 8.6 12.0   CREATININE 0.70 0.64 0.62   CALCIUM 9.1 8.9 8.7   MG  --  2.30 2.10   ALBUMIN 4.1 3.8 3.7   ALKPHOS 52 55 48   ALT 14 11 10   AST 26 17 13   BILITOT 0.9 1.1 0.8       Microbiology Results (last 7 days)       ** No results found for the last 168 hours. **             CT Head Without Contrast  Narrative: EXAMINATION:  CT HEAD WITHOUT CONTRAST    CLINICAL HISTORY:  Stroke, follow up;    TECHNIQUE:  Sequential axial images were performed of the brain without contrast.    Dose product length of 876 mGycm. Automated exposure control was utilized to minimize radiation dose.    COMPARISON:  CT brain March 17, 2025.  MRI brain March 17, 2025.    FINDINGS:  Gray-white matter differentiation is intact.  There is no intracranial mass effect, midline shift, hydrocephalus or hemorrhage. There is no sulcal effacement or low attenuation changes to suggest recent large vessel territory infarction.  There is no acute extra axial fluid collection. Visualized paranasal sinuses are clear without mucosal thickening, polypoidal abnormality or air-fluid levels. Mastoid air  cells aeration is optimal.  Impression: No acute intracranial findings identified.    Electronically signed by: Blane Mueller  Date:    03/18/2025  Time:    13:31  US Transcran Doppler Intracran Artr Comp  Narrative: EXAMINATION:  US TRANSCRAN DOPPLER INTRACRAN ARTR COMP    CLINICAL HISTORY:  stroke;    TECHNIQUE:  Ultrasound imaging of both MCAs with spectral analysis after injection of saline bubbles with intermittent valsalva.    COMPARISON:  No relevant comparison studies available at the time of dictation.    FINDINGS:  Right-side: 0 HITS    Left-side: 0 HITS  Impression: Grade 0 exam.    Electronically signed by: Hemalatha Marie  Date:    03/18/2025  Time:    11:08  MRI Cervical Spine W WO Cont  Narrative: EXAMINATION:  MRI CERVICAL SPINE W WO CONTRAST    CLINICAL HISTORY:  Myelopathy, acute, cervical spine;right sided weakness;    TECHNIQUE:  Multiplanar, multisequence MR imaging of the cervical spine with and without contrast.    COMPARISON:  None    FINDINGS:  Cervical alignment is normal.  The vertebral body heights are maintained.  The bone marrow is normal in signal.    There is no cord signal abnormality.  There is no abnormal intrathecal or epidural enhancement.    There is no disc herniation.  The spinal canal and neural foramina are patent.    The paraspinal soft tissues are unremarkable.  Impression: 1. No cord signal abnormality or abnormal enhancement.  2. No significant spinal canal or neural foraminal stenosis.    Electronically signed by: Hemalatha Marie  Date:    03/18/2025  Time:    09:26  MRI Brain W WO Contrast  Narrative: EXAMINATION:  MRI BRAIN W WO CONTRAST    CLINICAL HISTORY:  Neuro deficit, acute, stroke suspected;    TECHNIQUE:  Multiplanar, multisequence MR images of the brain were obtained with and without administration of intravenous contrast.    COMPARISON:  CT head dated 03/17/2025    FINDINGS:  There is no restricted diffusion, hemorrhage or edema.  The brain parenchyma is  normal in signal.  There is no abnormal parenchymal or leptomeningeal enhancement    There is no mass effect or midline shift.  The basal cisterns are patent.  The ventricles are normal in size.  There is no abnormal extra-axial fluid collection.  The major intracranial flow voids are patent.  There is trace scattered paranasal sinus mucosal thickening.  Impression: No acute intracranial abnormality.    Electronically signed by: Hemalatha Marie  Date:    03/18/2025  Time:    09:23        ASSESSMENT & PLAN:   Assessment:   Acute onset right-sided weakness with headache 03/17/2025 s/p TNK   TIA versus complex migraine  History of hypertension      Plan:  Q.4 neurochecks   BP parameters per neurology   PT/OT/SLP  Resume home medications as deemed appropriate once medication reconciliation is updated  Labs in AM    VTE Prophylaxis:  SCDs    Discharge Planning and Disposition: TBD    I, Ziyad Cooper PA-C have reviewed and discussed the case with Babita Galicia MD   Please see the attending MD's addendum for further assessment and plan.    Ziyad Cooper PA-C  Department of Hospital Medicine   [facility]   03/19/2025    This note was created with the assistance of 1std[employer] voice recognition software. There may be transcription errors as a result of using this technology, however minimal. Effort has been made to assure accuracy of transcription, but any obvious errors or omissions should be clarified with the author of the document.    _______________________________________________________________________________  MD Addendum:  I, Dr. Frida banks , assumed care of this patient today  For the patient encounter, I performed the substantive portion of the visit, I reviewed the NP/PA documentation, treatment plan, and medical decision making.  I had face to face time with this patient     A. History:  Chief Complaint: Transfer (Transfer from [facility]. Given TNK . GCS 15 )      Screening  for Social Drivers for health:  Patient screened for food insecurity, housing instability, transportation needs, utility difficulties, and interpersonal safety (select all that apply as identified as concern)  []Housing or Food  []Transportation Needs  []Utility Difficulties  []Interpersonal safety  [x]None    Present at Bedside:  Patient information was obtained from patient, patient's family, past medical records and ER records.      HISTORY OF PRESENT ILLNESS:   Eugene Lopez is a 24 y.o. female with a past medical history of hypertension who presented to [facility] on 3/17/2025 transferred from [facility] for neurology services.  Patient presented to outside facility on 03/17/2025 with right-sided weakness after waking up from a nap.  CT head revealed no acute abnormalities.  CTA head and neck revealed no hemodynamically significant flow-limiting stenosis identified.  Tele neurology was consulted with recommendations of TNK.  Patient was transferred to [facility] for higher level of care.  Patient reported headache with pain and pressure radiating down her neck and right arm.  Patient was admitted to ICU for close monitoring.  Neurology was consulted.  MRI brain with and without contrast revealed no acute abnormality.  MRI cervical spine with and without contrast revealed no cord signal abnormality or abnormal enhancement; no significant spinal canal or neuroforaminal stenosis.  Repeat CT head on 03/18 revealed no acute intracranial findings. Patient was cleared for downgrade out of ICU on 03/18/2020 and admitted to hospital medicine service.           ASSESSMENT & PLAN:   Assessment:   Acute onset right-sided weakness with headache 03/17/2025 s/p TNK   TIA versus complex migraine  History of hypertension        Plan:  Q.4 neurochecks   BP parameters per neurology   PT/OT/SLP  Resume home medications as deemed appropriate once medication reconciliation is updated  Labs in AM       All diagnosis and  differential diagnosis have been reviewed; assessment and plan has been documented; I have personally reviewed the labs and test results that are presently available; I have reviewed the patients medication list; I have reviewed the consulting providers response and recommendations. I have reviewed or attempted to review medical records based upon their availability.    All of the patient and family questions have been addressed and answered. Patient's is agreeable to the above stated plan. I will continue to monitor closely and make adjustments to medical management as needed.      03/19/2025          [1]   Current Facility-Administered Medications:     acetaminophen tablet 650 mg, 650 mg, Oral, Q6H PRN, Juwan Crowder MD, 650 mg at 03/18/25 1956    atorvastatin tablet 40 mg, 40 mg, Oral, Daily, Andria Mtz NP, 40 mg at 03/18/25 0846    bisacodyL suppository 10 mg, 10 mg, Rectal, Daily PRN, Andria Mtz, NP    mupirocin 2 % ointment, , Nasal, BID, Osorio Cortez MD, Given at 03/18/25 0846    sodium chloride 0.9% flush 10 mL, 10 mL, Intravenous, PRN, Juwan Crowder MD

## 2025-03-19 NOTE — DISCHARGE SUMMARY
[facility]  Hospital Medicine Discharge Summary    Admit Date: 3/17/2025  Discharge Date and Time: 3/19/297936:04 PM  Admitting Physician:  Team  Discharging Physician: Babita Galicia MD.  Primary Care Physician: Aundrea Ramirez FNP  Consults: Hospital Medicine, Neurology, and Pulmonary/Intensive care    Discharge Diagnoses:  Acute onset right-sided weakness with headache 03/17/2025 s/p TNK   TIA versus complex migraine  History of hypertension       Hospital Course:   Chief Complaint: Transfer (Transfer from [place] ICU. Given TNK . GCS 15 )       HISTORY OF PRESENT ILLNESS:   Eugene Lopez is a 24 y.o. female with a past medical history of hypertension who presented to [facility] on 3/17/2025 transferred from [facility] for neurology services.  Patient presented to outside facility on 03/17/2025 with right-sided weakness after waking up from a nap.  CT head revealed no acute abnormalities.  CTA head and neck revealed no hemodynamically significant flow-limiting stenosis identified.  Tele neurology was consulted with recommendations of TNK.  Patient was transferred to [facility] for higher level of care.  Patient reported headache with pain and pressure radiating down her neck and right arm.  Patient was admitted to ICU for close monitoring.  Neurology was consulted.  MRI brain with and without contrast revealed no acute abnormality.  MRI cervical spine with and without contrast revealed no cord signal abnormality or abnormal enhancement; no significant spinal canal or neuroforaminal stenosis.  Repeat CT head on 03/18 revealed no acute intracranial findings. Patient was cleared for downgrade out of ICU on 03/18/2020 and admitted to hospital medicine service    Patient was admitted for right-sided weakness.  Neurology recommended to receive to [facility].  She is status post TNK with no complications.  Right-sided weakness is improving.  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  Neuro recommends to be on aspirin 81 mg daily.  And okay to discharge       Pt was seen and examined on the day of discharge    Vitals:  VITAL SIGNS: 24 HRS MIN & MAX LAST   Temp  Min: 98.4 °F (36.9 °C)  Max: 98.8 °F (37.1 °C) 98.4 °F (36.9 °C)   BP  Min: 100/66  Max: 122/76 116/73   Pulse  Min: 74  Max: 92  91   Resp  Min: 14  Max: 24 (!) 24   SpO2  Min: 97 %  Max: 100 % 100 %       Physical Exam:  General appearance: Female in no apparent distress.  HENT: Atraumatic head.   Eyes: Normal extraocular movements.   Neck: Supple.   Lungs: Clear to auscultation bilaterally.   Heart: Regular rate and rhythm.  Abdomen: Soft, non-distended, non-tender.  Extremities: No cyanosis, clubbing, or deformities.   Skin: No Rash.   Neuro: Awake, alert, and oriented. Motor and sensory exams grossly intact. No facial droop. No slurred speech.  Strength equal and symmetric in bilateral upper and lower extremities   Psych/mental status: Appropriate mood and affect. Responds appropriately to questions.        Procedures Performed: No admission procedures for hospital encounter.     Significant Diagnostic Studies: See Full reports for all details    Recent Labs   Lab 03/17/25  1309 03/18/25  0421 03/19/25  0400   WBC 3.91* 4.14* 4.35*   RBC 4.82 4.54 4.28   HGB 11.3* 10.5* 10.0*   HCT 36.7* 34.4* 32.2*   MCV 76.1* 75.8* 75.2*   MCH 23.4* 23.1* 23.4*   MCHC 30.8* 30.5* 31.1*   RDW 19.4* 19.2* 18.8*    287 264   MPV 10.6* 10.6* 10.0       Recent Labs   Lab 03/17/25  1309 03/18/25  0421 03/19/25  0400    137 134*   K 4.7 3.8 3.8    106 103   CO2 22 20* 21*   BUN 10.5 8.6 12.0   CREATININE 0.70 0.64 0.62   CALCIUM 9.1 8.9 8.7   MG  --  2.30 2.10   ALBUMIN 4.1 3.8 3.7   ALKPHOS 52 55 48   ALT 14 11 10   AST 26 17 13   BILITOT 0.9 1.1 0.8        Microbiology Results (last 7 days)       ** No results found for the last 168 hours. **             Echo Saline Bubble? Yes    Left Ventricle: The  left ventricle is normal in size. Normal wall   thickness. There is normal systolic function with a visually estimated   ejection fraction of 55 - 60%. There is normal diastolic function.    Right Ventricle: The right ventricle is normal in size. Systolic   function is normal.    Left Atrium: Agitated saline study of the atrial septum is negative,   suggesting absence of intracardiac shunt at the atrial level.    Mitral Valve: There is mild regurgitation.    Tricuspid Valve: There is mild regurgitation.    Pulmonary Artery: The estimated pulmonary artery systolic pressure is   17 mmHg.    IVC/SVC: Normal venous pressure at 3 mmHg.    Pericardium: There is no pericardial effusion.    Consider RAYMOND if clinically indicated         Medication List        START taking these medications      aspirin 81 MG EC tablet  Commonly known as: ECOTRIN  Take 1 tablet (81 mg total) by mouth once daily.     butalbital-acetaminophen-caffeine -40 mg -40 mg per tablet  Commonly known as: FIORICET, ESGIC  Take 1 tablet by mouth every 6 (six) hours as needed for Headaches.            CONTINUE taking these medications      gabapentin 100 MG capsule  Commonly known as: NEURONTIN  Take 3 capsules (300 mg total) by mouth 2 (two) times daily.     promethazine 12.5 MG Tab  Commonly known as: PHENERGAN  Take 1 tablet (12.5 mg total) by mouth 2 (two) times a day.               Where to Get Your Medications        These medications were sent to [facility] [address] [place] [id number]      Phone: [phone]   aspirin 81 MG EC tablet  butalbital-acetaminophen-caffeine -40 mg -40 mg per tablet          Explained in detail to the patient about the discharge plan, medications, and follow-up visits. Pt understands and agrees with the treatment plan  Discharge Disposition: Planned Readmission - Discharged to other facility    Discharged Condition: stable  Diet-    Dietary Orders (From admission, onward)       Start     Ordered    03/18/25 1408  Diet Adult Regular  (Diet/Nutrition - [facility] Locations)  Diet effective now         03/17/25 1653                   Medications Per DC med rec  Activities as tolerated   Follow-up Information       Aundrea Ramirez, KATHI. Schedule an appointment as soon as possible for a visit in 2 week(s).    Specialty: Family Medicine  Contact information:  [address] [facility] [address]  [phone]                           For further questions contact hospitalist office    Discharge time 33 minutes    For worsening symptoms, chest pain, shortness of breath, increased abdominal pain, high grade fever, stroke or stroke like symptoms, immediately go to the nearest Emergency Room or call 911 as soon as possible.      Babita Washington M.D on 3/19/2025. at 12:04 PM.

## 2025-03-22 ENCOUNTER — HOSPITAL ENCOUNTER (EMERGENCY)
Facility: HOSPITAL | Age: 25
Discharge: HOME | End: 2025-03-22
Attending: STUDENT IN AN ORGANIZED HEALTH CARE EDUCATION/TRAINING PROGRAM
Payer: MEDICAID

## 2025-03-22 VITALS
OXYGEN SATURATION: 98 % | RESPIRATION RATE: 18 BRPM | TEMPERATURE: 98 F | WEIGHT: 140 LBS | HEIGHT: 64 IN | HEART RATE: 66 BPM | BODY MASS INDEX: 23.9 KG/M2 | DIASTOLIC BLOOD PRESSURE: 55 MMHG | SYSTOLIC BLOOD PRESSURE: 101 MMHG

## 2025-03-22 DIAGNOSIS — G43.809 OTHER MIGRAINE WITHOUT STATUS MIGRAINOSUS, NOT INTRACTABLE: Primary | ICD-10-CM

## 2025-03-22 LAB
ALBUMIN SERPL-MCNC: 4.1 G/DL (ref 3.5–5)
ALBUMIN/GLOB SERPL: 1.2 RATIO (ref 1.1–2)
ALP SERPL-CCNC: 57 UNIT/L (ref 40–150)
ALT SERPL-CCNC: 11 UNIT/L (ref 0–55)
ANION GAP SERPL CALC-SCNC: 8 MEQ/L
AST SERPL-CCNC: 17 UNIT/L (ref 11–45)
BASOPHILS # BLD AUTO: 0.03 X10(3)/MCL
BASOPHILS NFR BLD AUTO: 1.1 %
BILIRUB SERPL-MCNC: 0.4 MG/DL
BUN SERPL-MCNC: 11.2 MG/DL (ref 7–18.7)
CALCIUM SERPL-MCNC: 9 MG/DL (ref 8.4–10.2)
CHLORIDE SERPL-SCNC: 106 MMOL/L (ref 98–107)
CO2 SERPL-SCNC: 25 MMOL/L (ref 22–29)
CREAT SERPL-MCNC: 0.8 MG/DL (ref 0.55–1.02)
CREAT/UREA NIT SERPL: 14
EOSINOPHIL # BLD AUTO: 0.21 X10(3)/MCL (ref 0–0.9)
EOSINOPHIL NFR BLD AUTO: 7.5 %
ERYTHROCYTE [DISTWIDTH] IN BLOOD BY AUTOMATED COUNT: 18.8 % (ref 11.5–17)
GFR SERPLBLD CREATININE-BSD FMLA CKD-EPI: >60 ML/MIN/1.73/M2
GLOBULIN SER-MCNC: 3.5 GM/DL (ref 2.4–3.5)
GLUCOSE SERPL-MCNC: 89 MG/DL (ref 74–100)
HCT VFR BLD AUTO: 34.8 % (ref 37–47)
HGB BLD-MCNC: 10.8 G/DL (ref 12–16)
IMM GRANULOCYTES # BLD AUTO: 0.01 X10(3)/MCL (ref 0–0.04)
IMM GRANULOCYTES NFR BLD AUTO: 0.4 %
INR PPP: 1
LYMPHOCYTES # BLD AUTO: 0.98 X10(3)/MCL (ref 0.6–4.6)
LYMPHOCYTES NFR BLD AUTO: 34.9 %
MCH RBC QN AUTO: 23.8 PG (ref 27–31)
MCHC RBC AUTO-ENTMCNC: 31 G/DL (ref 33–36)
MCV RBC AUTO: 76.8 FL (ref 80–94)
MONOCYTES # BLD AUTO: 0.27 X10(3)/MCL (ref 0.1–1.3)
MONOCYTES NFR BLD AUTO: 9.6 %
NEUTROPHILS # BLD AUTO: 1.31 X10(3)/MCL (ref 2.1–9.2)
NEUTROPHILS NFR BLD AUTO: 46.5 %
NRBC BLD AUTO-RTO: 0 %
PLATELET # BLD AUTO: 308 X10(3)/MCL (ref 130–400)
PMV BLD AUTO: 10.6 FL (ref 7.4–10.4)
POTASSIUM SERPL-SCNC: 3.6 MMOL/L (ref 3.5–5.1)
PROT SERPL-MCNC: 7.6 GM/DL (ref 6.4–8.3)
PROTHROMBIN TIME: 12.9 SECONDS (ref 12.5–14.5)
RBC # BLD AUTO: 4.53 X10(6)/MCL (ref 4.2–5.4)
SODIUM SERPL-SCNC: 139 MMOL/L (ref 136–145)
WBC # BLD AUTO: 2.81 X10(3)/MCL (ref 4.5–11.5)

## 2025-03-22 PROCEDURE — 96375 TX/PRO/DX INJ NEW DRUG ADDON: CPT

## 2025-03-22 PROCEDURE — 80053 COMPREHEN METABOLIC PANEL: CPT

## 2025-03-22 PROCEDURE — 25000003 PHARM REV CODE 250: Performed by: STUDENT IN AN ORGANIZED HEALTH CARE EDUCATION/TRAINING PROGRAM

## 2025-03-22 PROCEDURE — 85025 COMPLETE CBC W/AUTO DIFF WBC: CPT

## 2025-03-22 PROCEDURE — 85610 PROTHROMBIN TIME: CPT

## 2025-03-22 PROCEDURE — 96374 THER/PROPH/DIAG INJ IV PUSH: CPT

## 2025-03-22 PROCEDURE — 99285 EMERGENCY DEPT VISIT HI MDM: CPT | Mod: 25

## 2025-03-22 PROCEDURE — 63600175 PHARM REV CODE 636 W HCPCS: Performed by: STUDENT IN AN ORGANIZED HEALTH CARE EDUCATION/TRAINING PROGRAM

## 2025-03-22 RX ORDER — PROCHLORPERAZINE EDISYLATE 5 MG/ML
10 INJECTION INTRAMUSCULAR; INTRAVENOUS
Status: COMPLETED | OUTPATIENT
Start: 2025-03-22 | End: 2025-03-22

## 2025-03-22 RX ORDER — ACETAMINOPHEN 10 MG/ML
1000 INJECTION, SOLUTION INTRAVENOUS ONCE
Status: COMPLETED | OUTPATIENT
Start: 2025-03-22 | End: 2025-03-22

## 2025-03-22 RX ORDER — AMITRIPTYLINE HYDROCHLORIDE 10 MG/1
10 TABLET, FILM COATED ORAL NIGHTLY
Qty: 30 TABLET | Refills: 11 | Status: SHIPPED | OUTPATIENT
Start: 2025-03-22 | End: 2026-03-22

## 2025-03-22 RX ORDER — SODIUM CHLORIDE 9 MG/ML
1000 INJECTION, SOLUTION INTRAVENOUS
Status: COMPLETED | OUTPATIENT
Start: 2025-03-22 | End: 2025-03-22

## 2025-03-22 RX ORDER — DIPHENHYDRAMINE HYDROCHLORIDE 50 MG/ML
50 INJECTION, SOLUTION INTRAMUSCULAR; INTRAVENOUS
Status: COMPLETED | OUTPATIENT
Start: 2025-03-22 | End: 2025-03-22

## 2025-03-22 RX ADMIN — SODIUM CHLORIDE 1000 ML: 9 INJECTION, SOLUTION INTRAVENOUS at 03:03

## 2025-03-22 RX ADMIN — DIPHENHYDRAMINE HYDROCHLORIDE 50 MG: 50 INJECTION INTRAMUSCULAR; INTRAVENOUS at 03:03

## 2025-03-22 RX ADMIN — PROCHLORPERAZINE EDISYLATE 10 MG: 5 INJECTION INTRAMUSCULAR; INTRAVENOUS at 03:03

## 2025-03-22 RX ADMIN — ACETAMINOPHEN 1000 MG: 10 INJECTION, SOLUTION INTRAVENOUS at 03:03

## 2025-03-22 NOTE — ED PROVIDER NOTES
Encounter Date: 3/22/2025    SCRIBE #1 NOTE: I, Alcon Pacheco, am scribing for, and in the presence of,  Duglas Browning IV, MD. I have scribed the following portions of the note - Other sections scribed: HPI, ROS, PE.       History     Chief Complaint   Patient presents with    Dizziness     Pt reports d/c'd Wednesday after ICU stay for stroke, received TNK. States since d/c pt has had worsening frontal headache radiating to occipital area, dizziness, and blurred vision. Pt ambulatory in triage. Sensation intact, no unilateral deficits.     Patient is a 23 y/o female with a hx of HTN and CVA who presents to the ED for a headache for the last few days. 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 Pt notes prior to admission she was having right sided weakness and aphasia but following discharge these symptoms resolved but she began having a worsening headache and dizziness. Pt reports receiving fiorcet PRN at discharge but notes no relief. Pt denies any current weakness or visual disturbance.     The history is provided by the patient and medical records. No  was used.     Review of patient's allergies indicates:  No Known Allergies  Past Medical History:   Diagnosis Date    Bilateral ovarian cysts     Fibroids     Hx of hemorrhoids     Hypertension      Past Surgical History:   Procedure Laterality Date    APPENDECTOMY      BUNIONECTOMY      hem      tonsilectomy       Family History   Problem Relation Name Age of Onset    Breast cancer Paternal Grandmother      Breast cancer Maternal Aunt       Social History[1]  Review of Systems   Constitutional:  Negative for chills and fever.   HENT:  Negative for congestion, rhinorrhea and sore throat.    Eyes:  Negative for visual disturbance.   Respiratory:  Negative for cough and shortness of breath.    Cardiovascular:  Negative for chest pain and leg  swelling.   Gastrointestinal:  Negative for abdominal pain, nausea and vomiting.   Genitourinary:  Negative for dysuria, hematuria, vaginal bleeding and vaginal discharge.   Musculoskeletal:  Negative for joint swelling.   Skin:  Negative for rash.   Neurological:  Positive for dizziness and headaches. Negative for weakness.   Psychiatric/Behavioral:  Negative for confusion.        Physical Exam     Initial Vitals [03/22/25 1427]   BP Pulse Resp Temp SpO2   123/77 99 18 97.9 °F (36.6 °C) 100 %      MAP       --         Physical Exam    Nursing note and vitals reviewed.  Constitutional: She is not diaphoretic. No distress.   HENT:   Head: Normocephalic and atraumatic.   Neck: Neck supple.   Normal range of motion.  Cardiovascular:  Normal rate and regular rhythm.           No murmur heard.  Pulmonary/Chest: Breath sounds normal. No respiratory distress.   Abdominal: Abdomen is soft. She exhibits no distension. There is no abdominal tenderness.   Musculoskeletal:      Cervical back: Normal range of motion and neck supple.     Neurological: She is alert and oriented to person, place, and time. She has normal strength. No cranial nerve deficit or sensory deficit. GCS eye subscore is 4. GCS verbal subscore is 5. GCS motor subscore is 6.   5/5 motor strength to the bilateral upper extremities with hand  and flexion and extension. 5/5 motor strength to the bilateral lower extremities with flexion and extension. Sensation intact to light touch to the bilateral upper and lower extremities. CN 2-12 grossly intact.    Skin: Skin is warm. Capillary refill takes less than 2 seconds.   Psychiatric: She has a normal mood and affect.         ED Course   Procedures  Labs Reviewed   CBC WITH DIFFERENTIAL - Abnormal       Result Value    WBC 2.81 (*)     RBC 4.53      Hgb 10.8 (*)     Hct 34.8 (*)     MCV 76.8 (*)     MCH 23.8 (*)     MCHC 31.0 (*)     RDW 18.8 (*)     Platelet 308      MPV 10.6 (*)     Neut % 46.5      Lymph %  34.9      Mono % 9.6      Eos % 7.5      Basophil % 1.1      Imm Grans % 0.4      Neut # 1.31 (*)     Lymph # 0.98      Mono # 0.27      Eos # 0.21      Baso # 0.03      Imm Gran # 0.01      NRBC% 0.0     PROTIME-INR - Normal    PT 12.9      INR 1.0      Narrative:     Protimes are used to monitor anticoagulant agents such as warfarin. PT INR values are based on the current patient normal mean and the JAG value for the specific instrument reagent used.  **Routine theraputic target values for the INR are 2.0-3.0**   CBC W/ AUTO DIFFERENTIAL    Narrative:     The following orders were created for panel order CBC auto differential.  Procedure                               Abnormality         Status                     ---------                               -----------         ------                     CBC with Differential[[id number]]       Abnormal            Final result                 Please view results for these tests on the individual orders.   COMPREHENSIVE METABOLIC PANEL    Sodium 139      Potassium 3.6      Chloride 106      CO2 25      Glucose 89      Blood Urea Nitrogen 11.2      Creatinine 0.80      Calcium 9.0      Protein Total 7.6      Albumin 4.1      Globulin 3.5      Albumin/Globulin Ratio 1.2      Bilirubin Total 0.4      ALP 57      ALT 11      AST 17      eGFR >60      Anion Gap 8.0      BUN/Creatinine Ratio 14            Imaging Results              CT Head Without Contrast (Final result)  Result time 03/22/25 14:58:12      Final result by Tiana Cao MD (03/22/25 14:58:12)                   Impression:      No acute abnormality seen      Electronically signed by: Zain Cao  Date:    03/22/2025  Time:    14:58               Narrative:    EXAMINATION:  CT HEAD WITHOUT CONTRAST    CLINICAL HISTORY:  Headache, new or worsening, neuro deficit (Age 19-49y);    TECHNIQUE:  Multiple axial images were obtained from the base of the brain to the vertex without contrast administration.   Sagittal and coronal reconstructions were performed. .Automatic exposure control  (AEC) is utilized to reduce patient radiation exposure.    COMPARISON:  03/18/2025    FINDINGS:  There is no intracranial mass or lesion seen.  No hemorrhage is seen.  No infarct is seen.  The ventricles and basilar cisterns appear normal.  Brain parenchyma appears grossly unremarkable.    Posterior fossa appears normal.  The calvarium is intact.  The paranasal sinuses appear grossly unremarkable.                                       Medications   acetaminophen 1,000 mg/100 mL (10 mg/mL) injection 1,000 mg (0 mg Intravenous Stopped 3/22/25 1609)   diphenhydrAMINE injection 50 mg (50 mg Intravenous Given 3/22/25 1555)   prochlorperazine injection Soln 10 mg (10 mg Intravenous Given 3/22/25 1555)   0.9% NaCl infusion (0 mLs Intravenous Stopped 3/22/25 1600)     Medical Decision Making  24-year-old presenting with a headache  Recent admission for possible straight although suspected hemiplegic migraine  Headache resolved with migraine cocktail well-appearing neuro intact CT head negative discussed with neurology given recent admission and TNK they state the patient can be discharged stroke neurologist did recommend they start 10 mg amitriptyline at night from migraine prophylaxis  This was prescribed patient discharge answered all questions the best of my ability return precautions given    Differential diagnosis includes but is not limited to:  Ich, cva, migraine, tension headache        Problems Addressed:  Other migraine without status migrainosus, not intractable: chronic illness or injury with exacerbation, progression, or side effects of treatment    Amount and/or Complexity of Data Reviewed  Labs: ordered.  Radiology: ordered and independent interpretation performed.     Details: CXR - no obvious infiltrates, consolidations, pleural effusions, or pneumothorax.      Discussion of management or test interpretation with external  provider(s): Discussed with Dr. Badillo stroke neurology - okay to discharge, start ppx for migraines amytryptyline    Risk  Prescription drug management.            Scribe Attestation:   Scribe #1: I performed the above scribed service and the documentation accurately describes the services I performed. I attest to the accuracy of the note.    Attending Attestation:           Physician Attestation for Scribe:  Physician Attestation Statement for Scribe #1: I, Duglas Browning IV, MD, reviewed documentation, as scribed by Alcon Pacheco in my presence, and it is both accurate and complete.             ED Course as of 03/22/25 2051   Sat Mar 22, 2025   1730 Paged stroke neurology  [LH]   1742 Dr. Badillo stroke neruo - no need for further workup - if headache improved can discharge started on amitriptyline 10 once a night [AC]   1750 Patient's headache has resolved she is amenable to plan for discharge at this time her stroke was thought to be secondary to a hemiplegic migraine return precautions given [AC]      ED Course User Index  [AC] Duglas Browning IV, MD  [LH] Alcon Pacheco                           Clinical Impression:  Final diagnoses:  [G43.809] Other migraine without status migrainosus, not intractable (Primary)          ED Disposition Condition    Discharge Stable          ED Prescriptions       Medication Sig Dispense Start Date End Date Auth. Provider    amitriptyline (ELAVIL) 10 MG tablet Take 1 tablet (10 mg total) by mouth every evening. 30 tablet 3/22/2025 3/22/2026 Duglas Browning IV, MD          Follow-up Information       Follow up With Specialties Details Why Contact Info    [facility] - Emergency Dept Emergency Medicine Go to  If symptoms worsen [address] [facility] [address]  [phone]    Primary care physician  Schedule an appointment as soon as possible for a visit   Follow up with you primary care physician.   If you do not have a primary care physician  call [phone] to schedule an appointment.    A[facility] Ramirez, P Family Medicine Schedule an appointment as soon as possible for a visit   [address]  [facility]  [place] [address]  [phone]                   [1]   Social History  Tobacco Use    Smoking status: Never    Smokeless tobacco: Never   Substance Use Topics    Alcohol use: Never    Drug use: Never        Duglas Browning IV, MD  03/22/25 2051

## 2025-03-22 NOTE — FIRST PROVIDER EVALUATION
"Medical screening examination initiated.  I have conducted a focused provider triage encounter, findings are as follows:    Brief history of present illness:  24 year old female presents to ER with c/o headache, dizziness, and blurred vision. Given TNK on 03/17/25 due to CVA. Dc from hospital on 03/19/25    Vitals:    03/22/25 1427   BP: 123/77   BP Location: Left arm   Pulse: 99   Resp: 18   Temp: 97.9 °F (36.6 °C)   TempSrc: Oral   SpO2: 100%   Weight: 63.5 kg (140 lb)   Height: 5' 4" (1.626 m)       Pertinent physical exam:  awake and alert, nad    Brief workup plan:  labs, imaging     Preliminary workup initiated; this workup will be continued and followed by the physician or advanced practice provider that is assigned to the patient when roomed.  "

## 2025-03-22 NOTE — DISCHARGE INSTRUCTIONS
Thanks for letting use take care of you today! It is our goal to give you courteous care and to keep you comfortable and informed. If you have any questions before you leave I will be happy to try and answer them.     Advice after your visit:  Your visit in the emergency department is NOT definitive care - please follow-up with your primary care doctor and/or specialist within 1-2 days. If you do not have a primary care physician call [phone] to schedule an appointment. Please return if you have any worsening in your condition or if you have any other concerns.    Return to the emergency department if any worsening symptoms including fever, chest pain, difficulty breathing, weakness, numbness, tingling, nausea, vomiting, inability to eat, drink or take your medication, or any other new symptoms or concerns arise.      Please signup for MyChart as noted below in your paperwork to review all labwork, imaging results, and any other incidental findings from today's visit.     If you had radiology exams like an XRAY or CT in the emergency Department the interpreation on them may be preliminary - there may be less time sensitive findings on the reports please obtain these reports within 24 hours from the hospital or by using your out on your mobile phone to access records.  Bring these to your primary care doctor and/or specialist for further review of incidental findings.    Please review any LAB WORK from your visit today with your primary care physician.    If you were prescribed OPIATE PAIN MEDICATION - please understand of these medications can be addictive, you may fill less of the prescription was written for, you do not have to take the full prescription.  You may discard what you do not use.  Please seek help if you feel you are having problems with addiction.  Do not drive or operate heavy machinery if you are taking sedating medications.  Do not mix these medications with alcohol.      If you had a SPLINT  placed in the emergency department if you have severe pain numbness tingling or discoloration of year digits please remove the splint and return to the emergency department for further evaluation as this may represent a sign of compromise to the nerves or blood vessels due to swelling.    If you had SUTURES in the emergency department please have them removed in the prescribed time frame typically within 7-14 days.  You may shower but please do not bathe or swim.  Keep the wounds clean and dry and covered with a clean dressing.  Please return if he have any signs of infection like redness or drainage or pain at the suture site.    Please take the full course of  any ANTIBIOTICS you were prescribed - incomplete courses of antibiotics can cause resistance to antibiotics in the future which will make it difficult to treat any infections you may have.

## 2025-03-26 ENCOUNTER — OFFICE VISIT (OUTPATIENT)
Dept: FAMILY MEDICINE | Facility: CLINIC | Age: 25
End: 2025-03-26
Payer: MEDICAID

## 2025-03-26 VITALS
TEMPERATURE: 99 F | BODY MASS INDEX: 24.43 KG/M2 | HEIGHT: 65 IN | RESPIRATION RATE: 16 BRPM | DIASTOLIC BLOOD PRESSURE: 70 MMHG | WEIGHT: 146.63 LBS | HEART RATE: 90 BPM | OXYGEN SATURATION: 99 % | SYSTOLIC BLOOD PRESSURE: 110 MMHG

## 2025-03-26 DIAGNOSIS — G43.409 HEMIPLEGIC MIGRAINE WITHOUT STATUS MIGRAINOSUS, NOT INTRACTABLE: ICD-10-CM

## 2025-03-26 DIAGNOSIS — Z09 HOSPITAL DISCHARGE FOLLOW-UP: Primary | ICD-10-CM

## 2025-03-26 RX ORDER — METHOCARBAMOL 750 MG/1
750 TABLET, FILM COATED ORAL 3 TIMES DAILY
COMMUNITY
Start: 2025-02-06

## 2025-03-26 RX ORDER — PROPRANOLOL HYDROCHLORIDE 10 MG/1
10 TABLET ORAL 3 TIMES DAILY
Qty: 60 TABLET | Refills: 1 | Status: SHIPPED | OUTPATIENT
Start: 2025-03-26 | End: 2026-03-26

## 2025-03-26 NOTE — ASSESSMENT & PLAN NOTE
Start propranolol  Precautions discussed    Orders:    Ambulatory referral/consult to Neurology; Future    propranoloL (INDERAL) 10 MG tablet; Take 1 tablet (10 mg total) by mouth 3 (three) times daily.

## 2025-03-26 NOTE — PROGRESS NOTES
SUBJECTIVE:     History of Present Illness      Chief Complaint: Follow-up ([facility] for suspected CVA, right sided weakness and migraine.  Went back to er Saturday for persistent migraine given elavil 10 mg at HS.  Remains with migraine no relief.  Right sided weakness has improved but feels numb.  Needs neuro referral & patient states cardio referral.)    HPI:  Patient is a 24 y.o. year old female who presents to clinic for hospital follow-up acute onset right-sided weakness and headache.  Past medical history hypertension.  Patient presented to  [facility] on 03/17/2025 was transferred for Neurology Services.  Presenting with right side weakness after waking up from a nap.  Patient reported headache with pain and pressure radiating down her right. received TNK in hospital.  CT/ MRI revealed no abnormality.  Discharge diagnosis hemiplegic migraine discharge with aspirin 81 mg daily.    She returned to the emergency room 3/22/25.  Given a prescription for amitriptyline.  Reports in the past taking amitriptyline for headaches.  She reports she has not seen any changes increase fatigue with amitriptyline.  Reports she have symptoms of headaches, dizziness, moments of aphasia and blurred vision.  Denies nausea vomiting, chest pain or SOB  She reports that her balance is off.  Does have a history of migraine headaches.    Review of Systems:    Review of Systems    12 point review of systems conducted, negative except as stated in the history of present illness. See HPI for details.     Previous History      PCP: Aundrea Ramirez FNP  Review of patient's allergies indicates:  No Known Allergies    Past Medical History:   Diagnosis Date    Bilateral ovarian cysts     Fibroids     Hx of hemorrhoids     Hypertension     Stroke        Past Surgical History:   Procedure Laterality Date    APPENDECTOMY      BUNIONECTOMY      hem      tonsilectomy       Family History   Problem Relation Name Age of Onset    Breast cancer  "Paternal Grandmother      Breast cancer Maternal Aunt         Social History[1]     Health Maintenance      Health Maintenance   Topic Date Due    Chlamydia Screening  07/06/2023    Influenza Vaccine (1) 09/01/2024    COVID-19 Vaccine (5 - 2024-25 season) 09/01/2024    Pap Smear  06/30/2027    TETANUS VACCINE  02/16/2033    RSV Vaccine (Age 60+ and Pregnant patients) (1 - 1-dose 75+ series) 05/03/2076    Hepatitis C Screening  Completed    HIV Screening  Completed    Lipid Panel  Completed    HPV Vaccines  Completed    Pneumococcal Vaccines (Age 0-49)  Aged Out       OBJECTIVE:     Physical Exam      Vital Signs Reviewed   Visit Vitals  /70 (BP Location: Left arm, Patient Position: Sitting)   Pulse 90   Temp 98.8 °F (37.1 °C) (Oral)   Resp 16   Ht 5' 5" (1.651 m)   Wt 66.5 kg (146 lb 9.6 oz)   LMP 03/22/2025 (Exact Date)   SpO2 99%   BMI 24.40 kg/m²       Physical Exam    Physical Exam:  General: Alert, well nourished, no acute distress, non-toxic appearing.   Eyes: Anicteric sclera, without conjunctival injection, normal lids, no purulent drainage, EOMs grossly intact.   Ears: No tragal tenderness. Tympanic membranes intact, pearly grey, without effusion or erythema and with a positive light reflex.   Mouth: Posterior pharynx without erythema. No exudate, ulcerations, or lesion. No tonsillar swelling.   Neck: Supple, full ROM, no rigidity, no cervical adenopathy.   Cardio: Normal rate and rhythm    Resp: Respirations even and unlabored, clear to auscultation bilaterally.   Abd: No ecchymosis or distension. Normal bowel sounds in all 4 quadrants. No tenderness to palpation. No rebound tenderness or guarding. No CVA tenderness.   Skin: No rashes or open lesions noted.   MSK: No swelling. No abrasions or signs of trauma. Ambulating without assistance.   Neuro: Alert,oriented No focal deficits noted. Facial expressions even.   Psych: Cooperative, Normal affect      Labs   Chemistry:  Lab Results "   Component Value Date     03/22/2025    K 3.6 03/22/2025    BUN 11.2 03/22/2025    CREATININE 0.80 03/22/2025    EGFRNORACEVR >60 03/22/2025    GLUCOSE 89 03/22/2025    CALCIUM 9.0 03/22/2025    ALKPHOS 57 03/22/2025    LABPROT 7.6 03/22/2025    LABPROT 12.9 03/22/2025    ALBUMIN 4.1 03/22/2025    BILIDIR 0.4 08/10/2021    IBILI 0.60 08/10/2021    AST 17 03/22/2025    ALT 11 03/22/2025    MG 2.10 03/19/2025    PHOS 3.9 03/19/2025    ZHMPUOFE11AL 20.3 (L) 08/10/2021    TSH 1.756 03/17/2025    XDMNIZ5ADLX 0.91 07/23/2024        Lab Results   Component Value Date    HGBA1C 6.0 03/17/2025        Hematology:  Lab Results   Component Value Date    WBC 2.81 (L) 03/22/2025    HGB 10.8 (L) 03/22/2025    HCT 34.8 (L) 03/22/2025     03/22/2025       Lipid Panel:  Lab Results   Component Value Date    CHOL 179 03/17/2025    HDL 85 (H) 03/17/2025    LDL 86.00 03/17/2025    TRIG 41 03/17/2025    TOTALCHOLEST 2 03/17/2025        Urine:  Lab Results   Component Value Date    APPEARANCEUA Clear 03/17/2025    SGUA 1.015 03/17/2025    PROTEINUA Negative 03/17/2025    KETONESUA Negative 03/17/2025    LEUKOCYTESUR Negative 03/17/2025    RBCUA None Seen 10/19/2023    WBCUA 0-5 10/19/2023    BACTERIA Few (A) 10/19/2023    CREATRANDUR 59.9 11/21/2022    PROTEINURINE <6.8 11/21/2022         Assessment         ICD-10-CM ICD-9-CM   1. Hospital discharge follow-up  Z09 V67.59   2. Hemiplegic migraine without status migrainosus, not intractable  G43.409 346.30       Plan       Assessment & Plan  Hospital discharge follow-up  Residual headache  Cont Aspirin daily        Hemiplegic migraine without status migrainosus, not intractable  Start propranolol  Precautions discussed    Orders:    Ambulatory referral/consult to Neurology; Future    propranoloL (INDERAL) 10 MG tablet; Take 1 tablet (10 mg total) by mouth 3 (three) times daily.       Orders Placed This Encounter    Ambulatory referral/consult to Neurology    propranoloL  (INDERAL) 10 MG tablet      Medication List with Changes/Refills   New Medications    PROPRANOLOL (INDERAL) 10 MG TABLET    Take 1 tablet (10 mg total) by mouth 3 (three) times daily.   Current Medications    AMITRIPTYLINE (ELAVIL) 10 MG TABLET    Take 1 tablet (10 mg total) by mouth every evening.    ASPIRIN (ECOTRIN) 81 MG EC TABLET    Take 1 tablet (81 mg total) by mouth once daily.    BUTALBITAL-ACETAMINOPHEN-CAFFEINE -40 MG (FIORICET, ESGIC) -40 MG PER TABLET    Take 1 tablet by mouth every 6 (six) hours as needed for Headaches.    GABAPENTIN (NEURONTIN) 100 MG CAPSULE    Take 3 capsules (300 mg total) by mouth 2 (two) times daily.    METHOCARBAMOL (ROBAXIN) 750 MG TAB    Take 750 mg by mouth 3 (three) times daily.    PROMETHAZINE (PHENERGAN) 12.5 MG TAB    Take 1 tablet (12.5 mg total) by mouth 2 (two) times a day.         Follow up in about 26 days (around 4/21/2025), or if symptoms worsen or fail to improve, for TSH . In addition to their scheduled follow up, the patient has also been instructed to follow up on as needed basis.   Future Appointments   Date Time Provider Department Center   5/7/2025  1:30 PM Aundrea Ramirez FNP [facility]   7/22/2025 10:00 AM Nikolas Boudreaux MD [facility]       KATHI Alonzo                [1]  Social History  Tobacco Use    Smoking status: Never    Smokeless tobacco: Never   Substance Use Topics    Alcohol use: Never    Drug use: Not Currently     Types: Marijuana     Comment: medical

## 2025-04-02 ENCOUNTER — TELEPHONE (OUTPATIENT)
Dept: FAMILY MEDICINE | Facility: CLINIC | Age: 25
End: 2025-04-02
Payer: MEDICAID

## 2025-04-02 NOTE — TELEPHONE ENCOUNTER
----- Message from KATHI Alonzo sent at 3/26/2025  3:45 PM CDT -----  Regarding: needs  appointment  Schedule patient a Wellness/ Labs during the week of 4/21/25

## 2025-04-03 NOTE — PHYSICIAN QUERY
Please document your best medical opinion regarding the etiology of diagnosis:  likely related to hemiplegic migraine.

## 2025-05-05 ENCOUNTER — TELEPHONE (OUTPATIENT)
Dept: FAMILY MEDICINE | Facility: CLINIC | Age: 25
End: 2025-05-05
Payer: MEDICAID

## 2025-05-05 DIAGNOSIS — R10.9 ABDOMINAL PAIN, UNSPECIFIED ABDOMINAL LOCATION: ICD-10-CM

## 2025-05-05 DIAGNOSIS — Z00.00 WELLNESS EXAMINATION: Primary | ICD-10-CM

## 2025-05-05 DIAGNOSIS — R53.1 RIGHT SIDED WEAKNESS: ICD-10-CM

## 2025-05-05 DIAGNOSIS — G43.409 HEMIPLEGIC MIGRAINE WITHOUT STATUS MIGRAINOSUS, NOT INTRACTABLE: ICD-10-CM

## 2025-05-15 DIAGNOSIS — G43.409 HEMIPLEGIC MIGRAINE WITHOUT STATUS MIGRAINOSUS, NOT INTRACTABLE: Primary | ICD-10-CM

## 2025-08-07 ENCOUNTER — PATIENT OUTREACH (OUTPATIENT)
Facility: CLINIC | Age: 25
End: 2025-08-07
Payer: MEDICAID

## 2025-08-07 NOTE — PROGRESS NOTES
Population Health Outreach.    Health Maintenance Topic(s) Outreach Outcomes & Actions Taken:    Cervical Cancer Screening - Outreach Outcomes & Actions Taken  : External Records Uploaded & Care Team Updated if Applicable         Additional Notes:    06.30.2022 HPV Screening Dr SCOTT amin linked